# Patient Record
Sex: MALE | NOT HISPANIC OR LATINO | Employment: STUDENT | URBAN - METROPOLITAN AREA
[De-identification: names, ages, dates, MRNs, and addresses within clinical notes are randomized per-mention and may not be internally consistent; named-entity substitution may affect disease eponyms.]

---

## 2020-08-26 ENCOUNTER — TRANSFERRED RECORDS (OUTPATIENT)
Dept: HEALTH INFORMATION MANAGEMENT | Facility: CLINIC | Age: 30
End: 2020-08-26

## 2020-08-26 ENCOUNTER — MEDICAL CORRESPONDENCE (OUTPATIENT)
Dept: HEALTH INFORMATION MANAGEMENT | Facility: CLINIC | Age: 30
End: 2020-08-26

## 2020-08-27 ENCOUNTER — TRANSCRIBE ORDERS (OUTPATIENT)
Dept: OTHER | Age: 30
End: 2020-08-27

## 2020-08-27 DIAGNOSIS — K40.90 INGUINAL HERNIA: Primary | ICD-10-CM

## 2020-08-28 ENCOUNTER — TELEPHONE (OUTPATIENT)
Dept: SURGERY | Facility: CLINIC | Age: 30
End: 2020-08-28

## 2020-08-28 NOTE — TELEPHONE ENCOUNTER
[facility]    Phone Message    May a detailed message be left on voicemail: yes     Reason for Call: Other: Pt requesting call back. Pt is being referred to see Dr. Farah for an Inguinal Hernia. Pt would like to know what anchoring technique he uses prior to scheduling the appt, if he uses robotics or not     Action Taken: Message routed to:  [facility] surgery

## 2020-08-28 NOTE — TELEPHONE ENCOUNTER
Contacted patient to answer his questions. Discussed that Dr. Farah does robotic procedures. He has been scheduled for a consult on 9/15 at 7 am. Address verified. Patient will call with any further questions.

## 2020-09-14 ENCOUNTER — PATIENT OUTREACH (OUTPATIENT)
Dept: SURGERY | Facility: CLINIC | Age: 30
End: 2020-09-14

## 2020-09-14 NOTE — PROGRESS NOTES
Established Patient Telephone Reminder Call    Date of call:  09/14/20  Phone numbers:  Home number on file [phone] (home)    Reached patient/confirmed appointment:  No - left message:   on voicemail  Appointment with:   Dr. Paul Farah  Reason for visit:  Hernia    Appt time changed to 0730.

## 2020-09-15 ENCOUNTER — TELEPHONE (OUTPATIENT)
Dept: SURGERY | Facility: CLINIC | Age: 30
End: 2020-09-15

## 2020-09-15 ENCOUNTER — OFFICE VISIT (OUTPATIENT)
Dept: SURGERY | Facility: CLINIC | Age: 30
End: 2020-09-15
Attending: FAMILY MEDICINE
Payer: COMMERCIAL

## 2020-09-15 VITALS
WEIGHT: 146.2 LBS | OXYGEN SATURATION: 97 % | HEIGHT: 70 IN | HEART RATE: 58 BPM | BODY MASS INDEX: 20.93 KG/M2 | DIASTOLIC BLOOD PRESSURE: 75 MMHG | SYSTOLIC BLOOD PRESSURE: 125 MMHG

## 2020-09-15 DIAGNOSIS — K40.20 NON-RECURRENT BILATERAL INGUINAL HERNIA WITHOUT OBSTRUCTION OR GANGRENE: Primary | ICD-10-CM

## 2020-09-15 RX ORDER — CEFAZOLIN SODIUM 2 G/50ML
2 SOLUTION INTRAVENOUS
Status: CANCELLED | OUTPATIENT
Start: 2020-09-15

## 2020-09-15 RX ORDER — CEFAZOLIN SODIUM 1 G/50ML
1 INJECTION, SOLUTION INTRAVENOUS SEE ADMIN INSTRUCTIONS
Status: CANCELLED | OUTPATIENT
Start: 2020-09-15

## 2020-09-15 ASSESSMENT — MIFFLIN-ST. JEOR: SCORE: 1624.41

## 2020-09-15 ASSESSMENT — PAIN SCALES - GENERAL: PAINLEVEL: NO PAIN (0)

## 2020-09-15 NOTE — LETTER
9/15/2020       RE: Johnnie Sahni  [address] [facility]  [address]  [place] [address]     Dear Colleague,    Thank you for referring your patient, Johnnie Sahni, to the [facility] GENERAL SURGERY at [facility]. Please see a copy of my visit note below.    Johnnie Sahni is a 30 year old male with a 3 month history of a left inguinal mass with the following symptoms of lump and pain.    Onset did not occur with lifting.  Obstructive symptoms:  no  Urinary difficulties:  no  Chronic cough: no  Constipation:  no  Current level of activity:  High, works out regularly, student at [employer], lifts animals.    Past medical and surgical history, medications, allergies, family history, and social history were reviewed with the patient.    ROS: 10 point review of systems negative except noted in HPI  PHYSICAL EXAM  General appearance- healthy, alert, and in no distress.  Skin- Skin color and turgor normal.  No obvious rashes.  Neck- Neck is supple without obvious adenopathy.  Lungs- Respiratory effort unlabored.  Gait- Normal.  Abdomen - soft non distended, non tender without obvious masses.  BIH left greater than right.  Impression:  Inguinal hernia, bilateral  Recommendation:  Laparoscopic bilateral Inguinal Hernioplasty Robot assisted    A full discussion regarding the alternatives, risks, goals, and potential complications for this surgery was completed today.  The patient understood I would use non recalled mesh and  that the potential problems included but are not limited to:  Infection, bleeding, hematoma, seroma, recurrence, injury to nerve/muscle or involved testicle(if male), ischemic orchitis(if male), and chronic pain.    The patient verbally expressed understanding, was given the opportunity for questions, and gives full informed consent for the procedure.      Today the patient was instructed on the need for a preoperative H&P, NPO status prior to surgery, and the need to stop  anticoagulants prior to surgery.  Additional educational material, soap, and instructions will be mailed out to the patients home.    The total time spent with this patient was 20 minutes.  Of this time, greater than 50% was spent counseling and coordinating care.                Again, thank you for allowing me to participate in the care of your patient.      Sincerely,    Paul Farah MD

## 2020-09-15 NOTE — PATIENT INSTRUCTIONS
You met with Dr. Paul Farah.      Today's visit instructions:    Reminder:  Surgery Requirements  1. Your surgery will be at [address] [place] [address] or [facility] [place]  2. You will need to arrive 1 1/2 to 2 hours early based on the location of your surgery.  3. You will need someone to drive you home (over 18 years old) and stay with you for 24 hours after the procedure  4. You will need a preop physical with your regular doctor within 30 days of surgery- closer is always better  5. Stop any blood thinners, vitamins, minerals, or herbal supplements 5 days before surgery.  If you are taking a prescribed blood thinner please let us know for specific instructions  6. Fasting- a nurse from [facility] will call you 1-2 days before surgery to confirm your procedure and tell you when to stop eating and drinking  7. Wash with the soap the night before surgery and morning of surgery. See instructions in the Surgery Packet.  8. If you would like a procedure estimate please call Aline SABILLON, Financial Counselor, [phone].    At this time, any patient that does not have COVID-19 testing within 4 days of surgery and results available to the surgeon and anesthesia team before the procedure may have their procedure postponed or canceled. We do this to keep our patients, providers, and employees safe. If you decline to test, then you will need to contact your surgeon to determine when or if your procedure will still take place.    OR    We highly encourage patients to get tested for COVID-19 at one of our designated [facility] testing sites. We process the tests in our lab, which allows us to get the results quickly. If you choose to get tested at a non-[facility] location, you will need to contact your primary care provider to make those arrangements and ensure the results are available to your surgeon before you arrive for your procedure. If we do not receive the results in time, your procedure  may be postponed or canceled. Please make sure your test is collected 3-days prior to your procedure date. The results will need to get faxed to [phone].       After Your Laparoscopic Robotic Bilateral Inguinal Hernia Repair         Incision care     Remove the bandage the day after surgery, but leave the medical tape (Steri-Strips) or glue in place. These will loosen and fall off on their own 5 to 7 days after surgery.     You may take a shower the day after surgery. Carefully wash your incision with soap and water. Do not submerge yourself in water (bath, whirlpool, hot tub, pool, lake) for 14 days after surgery.       Always wash your hands before touching your incisions or removing bandages.     It is not unusual to form a collection of fluid or blood under your incision that may feel firm or squishy- it can take several weeks to months for your body to reabsorb it.  At times, it may even drain.  If that should happen keep the area clean with soap, water,  and cover with a clean gauze dressing. You can change this daily or as needed.     Other medicines     Wait to start aspirin or blood thinners until the day after surgery. You can continue your regular medicines at your normal time the day after surgery.     Your pain medicine may cause constipation (hard, dry stools). To help with this, take the stool softener your doctor gave you or an over-the-counter stool softener or laxative. You can stop taking this when you are no longer taking pain medicine and your bowel movements are back to normal.      For pain or discomfort     Take the narcotic pain medicine your doctor gave you as needed and as instructed on the bottle. If you prefer to use over-the-counter medication, use acetaminophen (Tylenol) or ibuprofen (Advil, Motrin) as instructed on the box. Do not take Tylenol if it is in your narcotic pain medication.     Use an ice pack on your groin for 20 minutes at a time as needed for the first 24 hours. Be  sure to protect your skin by putting a cloth between the ice pack and your skin.     After 24 hours you can switch to heat for 20 minutes as needed. Be sure to protect your skin by putting a cloth between the heat pack and your skin.     It is normal to feel a lump in your groin after surgery. This lump may take up to 8 weeks to go away.      Activities     No driving until you feel it s safe to do so. Don t drive while taking narcotic pain medicine.     You can return to your other activities as normal, no restrictions.      Special equipment     If we gave you an athletic supporter, wear this for the first 3 days after surgery. You can wear it longer than this if you wish.      Diet     You can eat your regular meals after surgery.      When to call the doctor   Call your doctor if you have:     A fever above 101 F (38.3 C) (taken under the tongue), or a fever or chills lasting more than a day.     Redness at the incision site.     Any fluid or blood draining from the incision, especially if it smells bad.      Severe pain that doesn t improve with pain medicine.      Go to the emergency room if:   You can t urinate (pee) or feel pressure when you urinate. We will place a small, thin tube (catheter) to empty your bladder. This needs to stay in place for at least 2 days.      We will call you 2 to 4 days after surgery to review this handout, answer questions and help arrange after-surgery care. If you have questions or concerns, please call [phone] during regular office hours. If you need to call after business hours, call [phone] and ask to page the surgeon on-call.     If you have questions please contact Esequiel MILLS or Pattie MILLS during regular clinic hours, Monday through Friday 7:30 AM - 4:00 PM, or you can contact us via [employer] at anytime.       If you have urgent needs after-hours, weekends, or holidays please call the hospital at [phone] and ask to speak with our on-call General Surgery  Team.    Appointment [phone], option #1   Nurse Advice (Esequiel Blankenship): [phone]   Surgery Scheduler (Adriana): [phone]  Fax: [phone]

## 2020-09-15 NOTE — PROGRESS NOTES
Johnnie Sahni is a 30 year old male with a 3 month history of a left inguinal mass with the following symptoms of lump and pain.    Onset did not occur with lifting.  Obstructive symptoms:  no  Urinary difficulties:  no  Chronic cough: no  Constipation:  no  Current level of activity:  High, works out regularly, student at [employer], lifts animals.    Past medical and surgical history, medications, allergies, family history, and social history were reviewed with the patient.    ROS: 10 point review of systems negative except noted in HPI  PHYSICAL EXAM  General appearance- healthy, alert, and in no distress.  Skin- Skin color and turgor normal.  No obvious rashes.  Neck- Neck is supple without obvious adenopathy.  Lungs- Respiratory effort unlabored.  Gait- Normal.  Abdomen - soft non distended, non tender without obvious masses.  BIH left greater than right.  Impression:  Inguinal hernia, bilateral  Recommendation:  Laparoscopic bilateral Inguinal Hernioplasty Robot assisted    A full discussion regarding the alternatives, risks, goals, and potential complications for this surgery was completed today.  The patient understood I would use non recalled mesh and  that the potential problems included but are not limited to:  Infection, bleeding, hematoma, seroma, recurrence, injury to nerve/muscle or involved testicle(if male), ischemic orchitis(if male), and chronic pain.    The patient verbally expressed understanding, was given the opportunity for questions, and gives full informed consent for the procedure.      Today the patient was instructed on the need for a preoperative H&P, NPO status prior to surgery, and the need to stop anticoagulants prior to surgery.  Additional educational material, soap, and instructions will be mailed out to the patients home.    The total time spent with this patient was 20 minutes.  Of this time, greater than 50% was spent counseling and coordinating care.

## 2020-09-15 NOTE — NURSING NOTE
"Chief Complaint   Patient presents with     Consult     New patient consult for left inguinal hernia.        Vitals:    09/15/20 0725   BP: 125/75   BP Location: Left arm   Patient Position: Sitting   Cuff Size: Adult Regular   Pulse: 58   SpO2: 97%   Weight: 66.3 kg (146 lb 3.2 oz)   Height: 1.778 m (5' 10\")       Body mass index is 20.98 kg/m .    Shorty Prado LPN                        "

## 2020-09-15 NOTE — TELEPHONE ENCOUNTER
Patient wants to have a discussion about when robotic surgery with Dr Farah will be scheduled.    Spoke with patient, he is interested in [place] [facility], but wants to get his COVID-19 test done at the [facility]. Usually the [place] [facility] schedules their patients' COVID tests with them.     Discussed dates; [place] [facility] vs [place] would depend on if he can do his COVID test at the [facility] and also may be influenced by wanting to schedule out farther than we are currently able to do at the [facility].    Message sent to GENE Iraheta because patient has questions about recovery after surgery and whether it will affect his studies.    I told him that I'd check with the [place] [facility] to see if they would accept a COVID test from us instead of at their facility. I also said I'd ask the nurse to call him back to go over his medical questions. He said he will call me tomorrow regarding dates.

## 2020-09-15 NOTE — LETTER
9/15/2020       RE: Johnnie Sahni  [address] [facility]  [address]  [place] [address]     Dear Colleague,    Thank you for referring your patient, Johnnie Sahni, to the [facility] GENERAL SURGERY at [facility]. Please see a copy of my visit note below.    Johnnie Sahni is a 30 year old male with a 3 month history of a left inguinal mass with the following symptoms of lump and pain.    Onset did not occur with lifting.  Obstructive symptoms:  no  Urinary difficulties:  no  Chronic cough: no  Constipation:  no  Current level of activity:  High, works out regularly, student at [employer], lifts animals.    Past medical and surgical history, medications, allergies, family history, and social history were reviewed with the patient.    ROS: 10 point review of systems negative except noted in HPI  PHYSICAL EXAM  General appearance- healthy, alert, and in no distress.  Skin- Skin color and turgor normal.  No obvious rashes.  Neck- Neck is supple without obvious adenopathy.  Lungs- Respiratory effort unlabored.  Gait- Normal.  Abdomen - soft non distended, non tender without obvious masses.  BIH left greater than right.  Impression:  Inguinal hernia, bilateral  Recommendation:  Laparoscopic bilateral Inguinal Hernioplasty Robot assisted    A full discussion regarding the alternatives, risks, goals, and potential complications for this surgery was completed today.  The patient understood I would use non recalled mesh and  that the potential problems included but are not limited to:  Infection, bleeding, hematoma, seroma, recurrence, injury to nerve/muscle or involved testicle(if male), ischemic orchitis(if male), and chronic pain.    The patient verbally expressed understanding, was given the opportunity for questions, and gives full informed consent for the procedure.      Today the patient was instructed on the need for a preoperative H&P, NPO status prior to surgery, and the need to stop  anticoagulants prior to surgery.  Additional educational material, soap, and instructions will be mailed out to the patients home.    The total time spent with this patient was 20 minutes.  Of this time, greater than 50% was spent counseling and coordinating care.      Again, thank you for allowing me to participate in the care of your patient.  Sincerely,    Paul Farah MD

## 2020-09-15 NOTE — NURSING NOTE
Pre and Post op Patient Education/Teaching Flowsheet  Relevant Diagnosis:  Inguinal hernia  Teaching Topic:  Pre and post op teaching  Person(s) Involved in teaching:  Patient     Motivation Level:  Asks Questions:  Yes  Eager to Learn:  Yes  Cooperative:  Yes  Receptive (willing/able to accept information):  Yes  Any cultural factors/Adventist beliefs that may influence understanding or compliance?  No    Patient/caregiver/family demonstrates understanding of the following:  Reason for the appointment, diagnosis, and treatment plan:  Yes  Patient demonstrates understanding of the following:  Pre-op bowel prep:  No  Post-op pain management recommendations (medications, ice compress, binder/athletic supporter (if applicable), etc.:  Yes  Inguinal hernia patients:  Post-op urinary retention- discussed signs/symptoms and visit to ER for [place] catheter placement and to stay in place for at least 48 hours:  Yes  Restrictions:  Yes  Medications to take the day of surgery:  Per PCP  Blood thinner medications discussed and when to stop (if applicable):  Yes  Wound care:  Yes  Diabetes medication management (if applicable):  Per PCP  Which situations necessitate calling provider and whom to contact:  Discussed how to contact the hospital, nurse, and clinic scheduling staff if necessary      Date and time of surgery:  Yes  Location of surgery: [address] [place] [address] or [facility] [place]  History and Physical and any other testing necessary prior to surgery:  Yes. H&P w/PCP.  Required time line for completion of History and Physical and any pre-op testing:  Yes  Discuss need for someone to drive patient home and stay with them for 24 hours:  Yes  Pre-op showering/scrub information with Surgical Scrub:  Yes  NPO Guidelines:  NPO per Anesthesia Guidelines  COVID-19 Testing:  Yes    Infection Prevention: Patient demonstrates understanding of the following:  Patient instructed on hand hygiene:  Yes  Surgical  procedure site care will be taught and will be reviewed at the time of discharge  Signs and symptoms of infection taught:  Yes  Wound care reviewed and will be taught at the time of discharge  Central venous catheter care will be taught at the time of discharge (if applicable)    Post-op follow-up:  Instructional materials used/given/mailed:  [place] [facility] Booklet, post op teaching sheet, Map, Soap, and arrival/location information    Surgical instructions mailed to patient

## 2020-09-16 ENCOUNTER — PATIENT OUTREACH (OUTPATIENT)
Dept: SURGERY | Facility: CLINIC | Age: 30
End: 2020-09-16

## 2020-09-16 NOTE — TELEPHONE ENCOUNTER
Spoke with Madide at the [facility], patient can have his COVID test at the [facility], but results will need to be either in-hand or faxed to them. I set myself an in basket message reminder for Friday 11/6 at noon to fax the COVID-19 test results to the [facility] at [phone].    Spoke with patient, completed the following scheduling:    Patient is scheduled for surgery with Dr. Paul Farah    Spoke with or Left message for: Johnnie Sahni in clinic about surgery dates.    Date of Surgery: 11/9/2020 at 7:30 AM with Dr. Paul Farah    Location: [facility]    Pre-op with surgeon (if applicable): 9/15/2020    H&P: Scheduled with [facility] on 11/5/2020 at 11:20 AM. Patient is aware that he can call to schedule a pre-op with the [facility] ([facility]) if he is unable to schedule with his primary doctor.      Informed patient they will need an adult : Yes  Name of : patient is aware and will have a     Special Equipment: DaVinci Robot    COVID-19 testing: at the [facility] on Thursday 11/5/2020 at 11:00 am    Surgery packet: will send via [employer]     Additional comments: He also knows to call general surgery if he experiences any cold or flu symptoms. Surgery teaching and soap were given to in clinic on 9/15/2020. He was asked to call [phone] if he needs to reschedule and [phone] if he experiences any symptoms.    Sent message to GENE Blankenship to send fax to [facility].

## 2020-09-16 NOTE — PROGRESS NOTES
Attempted to contact patient to answer questions. Left a message asking him to call back when convenient. GS number provided.      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 He is having a robotic inguinal herniorrhaphy. Not sure of the date yet. Also not sure if [facility] vs [place] yet.     If you could possibly call patient to discuss what he can expect from recovery from surgery, that would be great - he says he forgot to ask questions today, and now he wishes he would have asked more questions.     Thank-you!  Adriana

## 2020-09-16 NOTE — PROGRESS NOTES
Patient returned call.  He recently met with Dr. Farah regarding an inguinal hernia and is inquiring about recovery after surgery.    All questions answered.

## 2020-09-22 ENCOUNTER — TELEPHONE (OUTPATIENT)
Dept: SURGERY | Facility: CLINIC | Age: 30
End: 2020-09-22

## 2020-09-22 NOTE — TELEPHONE ENCOUNTER
Spoke with patient, explained that with his surgery at [facility] being at 7:30 am on a Monday morning, it is not possible for me to guarantee that his COVID test results would be back in time if done at [facility]. So, I am recommending that he get his COVID test at the [facility] instead. Patient expressed understanding, the following scheduling was completed, with some changes made to previous appointments:    Spoke with Maddie at the [facility], patient can have his COVID test at the [facility] but results will need to be either in-hand or faxed to them. I set myself an in basket message reminder for Friday 11/6 at noon to fax the COVID-19 test results to the [facility] at [phone].     Spoke with patient, completed the following scheduling:     Patient is scheduled for surgery with Dr. Paul Farah     Spoke with or Left message for: Johnnie Sahni in clinic about surgery dates.     Date of Surgery: 11/9/2020 at 7:30 AM with Dr. Paul Farah     Location: [facility]     Pre-op with surgeon (if applicable): 9/15/2020     H&P: Scheduled with [facility] on 10/28/2020 at 2:30 PM. Patient is aware that he can call to schedule a pre-op with the [facility] ([facility]) if he is unable to schedule with his primary doctor.       Informed patient they will need an adult : Yes  Name of : girlfriend     Special Equipment: DaVinci Robot     COVID-19 testing: at the [facility] on 11/5/2020 at 12:00 PM     Surgery packet: sent again to reflect changes via [employer] and Mail today     Additional comments: He also knows to call general surgery if he experiences any cold or flu symptoms. Surgery teaching and soap were given to in clinic on 9/15/2020. He was asked to call [phone] if he needs to reschedule and [phone] if he experiences any symptoms.

## 2020-10-21 ENCOUNTER — OFFICE VISIT (OUTPATIENT)
Dept: FAMILY MEDICINE | Facility: CLINIC | Age: 30
End: 2020-10-21
Payer: COMMERCIAL

## 2020-10-21 VITALS
BODY MASS INDEX: 21.97 KG/M2 | WEIGHT: 153.1 LBS | TEMPERATURE: 98.1 F | HEART RATE: 81 BPM | DIASTOLIC BLOOD PRESSURE: 75 MMHG | OXYGEN SATURATION: 97 % | SYSTOLIC BLOOD PRESSURE: 111 MMHG | RESPIRATION RATE: 16 BRPM

## 2020-10-21 DIAGNOSIS — Z01.818 PRE-OPERATIVE EXAMINATION: ICD-10-CM

## 2020-10-21 DIAGNOSIS — K40.20 NON-RECURRENT BILATERAL INGUINAL HERNIA WITHOUT OBSTRUCTION OR GANGRENE: Primary | ICD-10-CM

## 2020-10-21 PROCEDURE — 99204 OFFICE O/P NEW MOD 45 MIN: CPT | Performed by: NURSE PRACTITIONER

## 2020-10-21 ASSESSMENT — PAIN SCALES - GENERAL: PAINLEVEL: NO PAIN (0)

## 2020-10-21 NOTE — LETTER
10/21/2020      RE: Johnnie Sahni  [address]   [address]  [place] [address]       [facility] [address]  5TH FLOOR  [facility] [address]  Phone: [phone]  Fax: [phone]  Primary Provider: No primary care provider on file.  Pre-op Performing Provider: SILVER BOOTHE    PREOPERATIVE EVALUATION:  Today's date: 10/21/2020    Johnnie Sahni is a 30 year old male who presents for a preoperative evaluation.    Surgical Information:  Surgery/Procedure: Bilateral Hernia Repair  Surgery Location: [facility]  Surgeon: Dr. Alicea  Surgery Date: 11/09/20  Time of Surgery: 7:30am  Where patient plans to recover: At home with family and At home alone  Fax number for surgical facility: Note does not need to be faxed, will be available electronically in Epic.    Type of Anesthesia Anticipated: General    Subjective     HPI related to upcoming procedure:     Preoperative Questionnaire:   No - Have you ever had a heart attack or stroke?  No - Have you ever had surgery on your heart or blood vessels, such as a stent, coronary (heart) bypass, or surgery on an artery in the head, neck, heart, or legs?  No - Do you have chest pain when you are physically active?  No - Do you have a history of heart failure?  No - Do you currently have a cold, bronchitis, or symptoms of other respiratory (head and chest) infections?  No - Do you have a cough, shortness of breath, or wheezing?  No - Do you or anyone in your family have a history of blood clots?  No - Do you or anyone in your family have a serious bleeding problem, such as long-lasting bleeding after surgeries or cuts?  No - Have you ever had anemia or been told to take iron pills?  No - Have you had any abnormal blood loss such as black, tarry or bloody stools, or abnormal vaginal bleeding?  No - Have you ever had a blood transfusion?  Yes - Are you willing to have a blood transfusion if it is medically needed  before, during, or after your surgery?  No - Have you or anyone in your family ever had problems with anesthesia (sedation for surgery)?  No - Do you have sleep apnea, excessive snoring, or daytime drowsiness?   No - Do you have any artifical heart valves or other implanted medical devices, such as a pacemaker, defibrillator, or continuous glucose monitor?  No - Do you have any artifical joints?  No - Are you allergic to latex?  No - Is there any chance that you may be pregnant?    Patient has a Health Care Directive or Living Will:  NO    Health Care Directive:  Patient does not have a Health Care Directive or Living Will: Discussed advance care planning with patient; however, patient declined at this time.    Preoperative Review of :   reviewed - no record of controlled substances prescribed.      Status of Chronic Conditions:  See problem list for active medical problems.  Problems all longstanding and stable, except as noted/documented.  See ROS for pertinent symptoms related to these conditions.      Review of Systems  Constitutional, neuro, ENT, endocrine, pulmonary, cardiac, gastrointestinal, genitourinary, musculoskeletal, integument and psychiatric systems are negative, except as otherwise noted.    There are no active problems to display for this patient.     No past medical history on file.  No past surgical history on file.  No current outpatient medications on file.       No Known Allergies     Social History     Tobacco Use     Smoking status: Never Smoker     Smokeless tobacco: Never Used   Substance Use Topics     Alcohol use: Yes     No family history on file.  History   Drug Use Unknown         Objective     There were no vitals taken for this visit.    Physical Exam    GENERAL APPEARANCE: healthy, alert and no distress     EYES: EOMI,  PERRL     HENT: ear canals and TM's normal and nose and mouth without ulcers or lesions     NECK: no adenopathy, no asymmetry, masses, or scars and thyroid  normal to palpation     RESP: lungs clear to auscultation - no rales, rhonchi or wheezes     CV: regular rates and rhythm, normal S1 S2, no S3 or S4 and no murmur, click or rub     ABDOMEN:  soft, nontender, no HSM or masses and bowel sounds normal     MS: extremities normal- no gross deformities noted, no evidence of inflammation in joints, FROM in all extremities.     SKIN: no suspicious lesions or rashes     NEURO: Normal strength and tone, sensory exam grossly normal, mentation intact and speech normal     PSYCH: mentation appears normal. and affect normal/bright     LYMPHATICS: No cervical adenopathy    No results for input(s): HGB, PLT, INR, NA, POTASSIUM, CR, A1C in the last 91392 hours.     Diagnostics:  No labs were ordered during this visit-not required.  No EKG required, no history of coronary heart disease, significant arrhythmia, peripheral arterial disease or other structural heart disease.    Revised Cardiac Risk Index (RCRI):  The patient has the following serious cardiovascular risks for perioperative complications:   - No serious cardiac risks = 0 points     RCRI Interpretation: 0 points: Class I (very low risk - 0.4% complication rate)       Assessment & Plan   The proposed surgical procedure is considered LOW risk.    Assessment & Plan     Non-recurrent bilateral inguinal hernia without obstruction or gangrene      Pre-operative examination    No follow-ups on file.    Sraanya Gray NP  [facility] [place]    Risks and Recommendations:  The patient has the following additional risks and recommendations for perioperative complications:   - No identified additional risk factors other than previously addressed    Medication Instructions:  Patient is on no chronic medications    RECOMMENDATION:  APPROVAL GIVEN to proceed with proposed procedure, without further diagnostic evaluation.    Signed Electronically by: AMBER Rick, CNP      Copy of this  evaluation report is provided to requesting physician.    Preop [facility] Preop Guidelines    Revised Cardiac Risk Index      Saranya Gray NP

## 2020-10-21 NOTE — PATIENT INSTRUCTIONS
First, have your Covid test as scheduled. Next, sit down the night prior to surgery and write down any questions you may have. Thank you.   [facility] Medication Refill Request Information:  * Please contact your pharmacy regarding ANY request for medication refills.  ** NP Clinic Prescription Fax = [phone]  * Please allow 3 business days for routine medication refills.  * Please allow 5 business days for controlled substance medication refills.     [facility] Test Result notification information:  *You will be notified with in 7-10 days of your appointment day regarding the results of your test.  If you are on MyChart you will be notified as soon as the provider has reviewed the results and signed off on them.    [facility]: [phone] ;

## 2020-10-21 NOTE — PROGRESS NOTES
[facility] [address] [address]  Phone: [phone]  Fax: [phone]  Primary Provider: No primary care provider on file.  Pre-op Performing Provider: SILVER BOOTHE    PREOPERATIVE EVALUATION:  Today's date: 10/21/2020    Johnnie Sahni is a 30 year old male who presents for a preoperative evaluation.    Surgical Information:  Surgery/Procedure: Bilateral Hernia Repair  Surgery Location: [facility]  Surgeon: Dr. Alicea  Surgery Date: 11/09/20  Time of Surgery: 7:30am  Where patient plans to recover: At home with family and At home alone  Fax number for surgical facility: Note does not need to be faxed, will be available electronically in Epic.    Type of Anesthesia Anticipated: General    Subjective     HPI related to upcoming procedure:     Preoperative Questionnaire:   No - Have you ever had a heart attack or stroke?  No - Have you ever had surgery on your heart or blood vessels, such as a stent, coronary (heart) bypass, or surgery on an artery in the head, neck, heart, or legs?  No - Do you have chest pain when you are physically active?  No - Do you have a history of heart failure?  No - Do you currently have a cold, bronchitis, or symptoms of other respiratory (head and chest) infections?  No - Do you have a cough, shortness of breath, or wheezing?  No - Do you or anyone in your family have a history of blood clots?  No - Do you or anyone in your family have a serious bleeding problem, such as long-lasting bleeding after surgeries or cuts?  No - Have you ever had anemia or been told to take iron pills?  No - Have you had any abnormal blood loss such as black, tarry or bloody stools, or abnormal vaginal bleeding?  No - Have you ever had a blood transfusion?  Yes - Are you willing to have a blood transfusion if it is medically needed before, during, or after your surgery?  No - Have you or anyone in your family ever  had problems with anesthesia (sedation for surgery)?  No - Do you have sleep apnea, excessive snoring, or daytime drowsiness?   No - Do you have any artifical heart valves or other implanted medical devices, such as a pacemaker, defibrillator, or continuous glucose monitor?  No - Do you have any artifical joints?  No - Are you allergic to latex?  No - Is there any chance that you may be pregnant?    Patient has a Health Care Directive or Living Will:  NO    Health Care Directive:  Patient does not have a Health Care Directive or Living Will: Discussed advance care planning with patient; however, patient declined at this time.    Preoperative Review of :   reviewed - no record of controlled substances prescribed.      Status of Chronic Conditions:  See problem list for active medical problems.  Problems all longstanding and stable, except as noted/documented.  See ROS for pertinent symptoms related to these conditions.      Review of Systems  Constitutional, neuro, ENT, endocrine, pulmonary, cardiac, gastrointestinal, genitourinary, musculoskeletal, integument and psychiatric systems are negative, except as otherwise noted.    There are no active problems to display for this patient.     No past medical history on file.  No past surgical history on file.  No current outpatient medications on file.       No Known Allergies     Social History     Tobacco Use     Smoking status: Never Smoker     Smokeless tobacco: Never Used   Substance Use Topics     Alcohol use: Yes     No family history on file.  History   Drug Use Unknown         Objective     There were no vitals taken for this visit.    Physical Exam    GENERAL APPEARANCE: healthy, alert and no distress     EYES: EOMI,  PERRL     HENT: ear canals and TM's normal and nose and mouth without ulcers or lesions     NECK: no adenopathy, no asymmetry, masses, or scars and thyroid normal to palpation     RESP: lungs clear to auscultation - no rales, rhonchi or  wheezes     CV: regular rates and rhythm, normal S1 S2, no S3 or S4 and no murmur, click or rub     ABDOMEN:  soft, nontender, no HSM or masses and bowel sounds normal     MS: extremities normal- no gross deformities noted, no evidence of inflammation in joints, FROM in all extremities.     SKIN: no suspicious lesions or rashes     NEURO: Normal strength and tone, sensory exam grossly normal, mentation intact and speech normal     PSYCH: mentation appears normal. and affect normal/bright     LYMPHATICS: No cervical adenopathy    No results for input(s): HGB, PLT, INR, NA, POTASSIUM, CR, A1C in the last 34473 hours.     Diagnostics:  No labs were ordered during this visit-not required.  No EKG required, no history of coronary heart disease, significant arrhythmia, peripheral arterial disease or other structural heart disease.    Revised Cardiac Risk Index (RCRI):  The patient has the following serious cardiovascular risks for perioperative complications:   - No serious cardiac risks = 0 points     RCRI Interpretation: 0 points: Class I (very low risk - 0.4% complication rate)       Assessment & Plan   The proposed surgical procedure is considered LOW risk.    Assessment & Plan     Non-recurrent bilateral inguinal hernia without obstruction or gangrene      Pre-operative examination    No follow-ups on file.    Saranya Gray NP  [facility] [place]    Risks and Recommendations:  The patient has the following additional risks and recommendations for perioperative complications:   - No identified additional risk factors other than previously addressed    Medication Instructions:  Patient is on no chronic medications    RECOMMENDATION:  APPROVAL GIVEN to proceed with proposed procedure, without further diagnostic evaluation.    Signed Electronically by: AMBER Rick, CNP      Copy of this evaluation report is provided to requesting physician.    [facility]  [place] Preop Guidelines    Revised Cardiac Risk Index

## 2020-10-21 NOTE — NURSING NOTE
Chief Complaint   Patient presents with     Pre-Op Exam     Patient comes in for a pre op exam.          Max Yeboah MA on 10/21/2020 at 2:04 PM

## 2020-11-09 ENCOUNTER — TRANSFERRED RECORDS (OUTPATIENT)
Dept: HEALTH INFORMATION MANAGEMENT | Facility: CLINIC | Age: 30
End: 2020-11-09

## 2020-11-10 ENCOUNTER — PATIENT OUTREACH (OUTPATIENT)
Dept: SURGERY | Facility: CLINIC | Age: 30
End: 2020-11-10

## 2020-11-10 ENCOUNTER — TELEPHONE (OUTPATIENT)
Dept: NURSING | Facility: CLINIC | Age: 30
End: 2020-11-10

## 2020-11-10 DIAGNOSIS — G89.18 POSTOPERATIVE PAIN: Primary | ICD-10-CM

## 2020-11-10 RX ORDER — HYDROCODONE BITARTRATE AND ACETAMINOPHEN 5; 325 MG/1; MG/1
1-2 TABLET ORAL EVERY 6 HOURS PRN
Qty: 10 TABLET | Refills: 0 | Status: ON HOLD | OUTPATIENT
Start: 2020-11-10 | End: 2020-11-12

## 2020-11-10 RX ORDER — DIAZEPAM 5 MG
5 TABLET ORAL EVERY 6 HOURS PRN
Qty: 7 TABLET | Refills: 0 | Status: ON HOLD | OUTPATIENT
Start: 2020-11-10 | End: 2020-11-12

## 2020-11-10 RX ORDER — ONDANSETRON 4 MG/1
4 TABLET, FILM COATED ORAL EVERY 6 HOURS PRN
Qty: 10 TABLET | Refills: 0 | Status: ON HOLD | OUTPATIENT
Start: 2020-11-10 | End: 2020-11-12

## 2020-11-10 NOTE — TELEPHONE ENCOUNTER
".[facility]: Nurse Triage Note  SITUATION/BACKGROUND                                                      Johnnie Sahni is a 30 year old male who calls with complaints of abdominal pain s/p bilateral inguino hernia repair on 11/9/20 by Paul Farah MD  Patient stated that he had been prescribed Norco 5 mg; take 2 pills every 4 hrs PRN for pain. Duration for relief is only lasting for 1.5 hours. This nurse inquired about incisions? Patient vague in response. \"Abdominal incisions  remain bandaged\".  This nurse asked how many and where? Initially, he stated 2, then 3. Areas are right/left/ middle.   Patient stated that he had emesis x 5 today - light green in color. He has a patch behind his ear prescribed for N/V that suppose to help for 3 days.  No BM yet. Only able to consume sips of liquids at this time...   Denies any fever or other symptoms at this time.   Routed to Surgery as High Priority to review and follow up call to [phone].         "

## 2020-11-10 NOTE — PROGRESS NOTES
Johnnie Sahni is a patient that underwent robotic inguinal hernia repair with Dr. Farah yesterday at the [facility] [place].  He is calling the office at this time with poor postoperative pain management and nausea/vomiting.      Spoke with the patient and he states that he has been taking Norco (2 tabs) every four hours as well as Zofran 8 mg every 8 hours.  He states that he becomes nauseated closer to the time that he can remedicate with narcotics.  He has Zofran on hand and he was encouraged to take it as directed and then wait at least 30 minutes and eat something small with a small amount of  fluid- if no nausea/vomiting he can take Norco.  Discussed plan to wean off of Norco over the next several days as pain improves.  Often the first 3 days after surgery are the most uncomfortable.  If no further nausea/vomiting he may start Ibuprofen 400 mg PO every 6 hours with food. Encouraged patient to take Valium as directed for a couple of doses then PRN which may provide better pain control.  Johnnie was asked to alternate heat/ice to his groin on/off every 20 minutes (protect skin from injury).  He was encouraged to ambulate every hour.    Rx for Norco, Valium, and Zofran sent to [facility] in [place] (where he picked up his Rx yesterday) and he would have preferred a closer location.  Spoke with a representative from the pharmacy and the Rx can not be transferred as they are scheduled medication.  Mr. Sahni will send a representative to the pharmacy.    All of the patients questions were answered. Plan to call him again tomorrow for routine post-procedure call.

## 2020-11-10 NOTE — TELEPHONE ENCOUNTER
Call from Johnnie, it has been an hour and he is asking if there is a response to his message.  Call to surgery, message and plan are being worked on, Johnnie will receive call back today.  Advised Johnnie he will be receiving call back today, no time frame given.    ADDENDUM  11/10/20  12:37 PM  Attempted to return call to patient at 1130 with no answer. LM on VM to call office. Situation discussed with Dr. Farah.  See RN[facility] encounter.  Pattie Aguayo RN, BSN, CNOR, RNFA, CBCN  [facility] General Surgery

## 2020-11-11 ENCOUNTER — PATIENT OUTREACH (OUTPATIENT)
Dept: SURGERY | Facility: CLINIC | Age: 30
End: 2020-11-11

## 2020-11-11 ENCOUNTER — APPOINTMENT (OUTPATIENT)
Dept: CT IMAGING | Facility: CLINIC | Age: 30
End: 2020-11-11
Attending: EMERGENCY MEDICINE
Payer: COMMERCIAL

## 2020-11-11 ENCOUNTER — HOSPITAL ENCOUNTER (INPATIENT)
Facility: CLINIC | Age: 30
LOS: 2 days | Discharge: HOME | End: 2020-11-13
Attending: EMERGENCY MEDICINE | Admitting: SURGERY
Payer: COMMERCIAL

## 2020-11-11 ENCOUNTER — NURSE TRIAGE (OUTPATIENT)
Dept: NURSING | Facility: CLINIC | Age: 30
End: 2020-11-11

## 2020-11-11 DIAGNOSIS — Z20.828 EXPOSURE TO SARS-ASSOCIATED CORONAVIRUS: ICD-10-CM

## 2020-11-11 DIAGNOSIS — K56.609 SMALL BOWEL OBSTRUCTION (H): Primary | ICD-10-CM

## 2020-11-11 LAB
ALBUMIN SERPL-MCNC: 4.1 G/DL (ref 3.4–5)
ALP SERPL-CCNC: 73 U/L (ref 40–150)
ALT SERPL W P-5'-P-CCNC: 19 U/L (ref 0–70)
ANION GAP SERPL CALCULATED.3IONS-SCNC: 8 MMOL/L (ref 3–14)
AST SERPL W P-5'-P-CCNC: 16 U/L (ref 0–45)
BASOPHILS # BLD AUTO: 0 10E9/L (ref 0–0.2)
BASOPHILS NFR BLD AUTO: 0.4 %
BILIRUB SERPL-MCNC: 0.9 MG/DL (ref 0.2–1.3)
BUN SERPL-MCNC: 19 MG/DL (ref 7–30)
CALCIUM SERPL-MCNC: 10.3 MG/DL (ref 8.5–10.1)
CHLORIDE SERPL-SCNC: 98 MMOL/L (ref 94–109)
CO2 SERPL-SCNC: 29 MMOL/L (ref 20–32)
CREAT SERPL-MCNC: 1.25 MG/DL (ref 0.66–1.25)
DIFFERENTIAL METHOD BLD: ABNORMAL
EOSINOPHIL # BLD AUTO: 0 10E9/L (ref 0–0.7)
EOSINOPHIL NFR BLD AUTO: 0.2 %
ERYTHROCYTE [DISTWIDTH] IN BLOOD BY AUTOMATED COUNT: 13.7 % (ref 10–15)
GFR SERPL CREATININE-BSD FRML MDRD: 76 ML/MIN/{1.73_M2}
GLUCOSE SERPL-MCNC: 112 MG/DL (ref 70–99)
HCT VFR BLD AUTO: 54.4 % (ref 40–53)
HGB BLD-MCNC: 18.1 G/DL (ref 13.3–17.7)
IMM GRANULOCYTES # BLD: 0 10E9/L (ref 0–0.4)
IMM GRANULOCYTES NFR BLD: 0.3 %
LYMPHOCYTES # BLD AUTO: 1.3 10E9/L (ref 0.8–5.3)
LYMPHOCYTES NFR BLD AUTO: 11.3 %
MCH RBC QN AUTO: 29 PG (ref 26.5–33)
MCHC RBC AUTO-ENTMCNC: 33.3 G/DL (ref 31.5–36.5)
MCV RBC AUTO: 87 FL (ref 78–100)
MONOCYTES # BLD AUTO: 1.1 10E9/L (ref 0–1.3)
MONOCYTES NFR BLD AUTO: 9.5 %
NEUTROPHILS # BLD AUTO: 8.9 10E9/L (ref 1.6–8.3)
NEUTROPHILS NFR BLD AUTO: 78.3 %
NRBC # BLD AUTO: 0 10*3/UL
NRBC BLD AUTO-RTO: 0 /100
PLATELET # BLD AUTO: 318 10E9/L (ref 150–450)
POTASSIUM SERPL-SCNC: 3.8 MMOL/L (ref 3.4–5.3)
PROT SERPL-MCNC: 8.8 G/DL (ref 6.8–8.8)
RBC # BLD AUTO: 6.24 10E12/L (ref 4.4–5.9)
SODIUM SERPL-SCNC: 134 MMOL/L (ref 133–144)
WBC # BLD AUTO: 11.3 10E9/L (ref 4–11)

## 2020-11-11 PROCEDURE — 99285 EMERGENCY DEPT VISIT HI MDM: CPT | Performed by: EMERGENCY MEDICINE

## 2020-11-11 PROCEDURE — 258N000003 HC RX IP 258 OP 636: Performed by: EMERGENCY MEDICINE

## 2020-11-11 PROCEDURE — 96375 TX/PRO/DX INJ NEW DRUG ADDON: CPT | Performed by: EMERGENCY MEDICINE

## 2020-11-11 PROCEDURE — 80053 COMPREHEN METABOLIC PANEL: CPT | Performed by: EMERGENCY MEDICINE

## 2020-11-11 PROCEDURE — 120N000002 HC R&B MED SURG/OB UMMC

## 2020-11-11 PROCEDURE — 250N000011 HC RX IP 250 OP 636: Performed by: EMERGENCY MEDICINE

## 2020-11-11 PROCEDURE — 74177 CT ABD & PELVIS W/CONTRAST: CPT | Mod: 26 | Performed by: RADIOLOGY

## 2020-11-11 PROCEDURE — 96361 HYDRATE IV INFUSION ADD-ON: CPT | Performed by: EMERGENCY MEDICINE

## 2020-11-11 PROCEDURE — 74177 CT ABD & PELVIS W/CONTRAST: CPT

## 2020-11-11 PROCEDURE — 99285 EMERGENCY DEPT VISIT HI MDM: CPT | Mod: 25 | Performed by: EMERGENCY MEDICINE

## 2020-11-11 PROCEDURE — C9803 HOPD COVID-19 SPEC COLLECT: HCPCS | Performed by: EMERGENCY MEDICINE

## 2020-11-11 PROCEDURE — 85025 COMPLETE CBC W/AUTO DIFF WBC: CPT | Performed by: EMERGENCY MEDICINE

## 2020-11-11 PROCEDURE — 96374 THER/PROPH/DIAG INJ IV PUSH: CPT | Mod: 59 | Performed by: EMERGENCY MEDICINE

## 2020-11-11 RX ORDER — OXYMETAZOLINE HYDROCHLORIDE 0.05 G/100ML
2 SPRAY NASAL
Status: COMPLETED | OUTPATIENT
Start: 2020-11-11 | End: 2020-11-12

## 2020-11-11 RX ORDER — IOPAMIDOL 755 MG/ML
92 INJECTION, SOLUTION INTRAVASCULAR ONCE
Status: COMPLETED | OUTPATIENT
Start: 2020-11-11 | End: 2020-11-11

## 2020-11-11 RX ORDER — POLYETHYLENE GLYCOL 3350 17 G/17G
1 POWDER, FOR SOLUTION ORAL DAILY
COMMUNITY

## 2020-11-11 RX ORDER — ONDANSETRON 2 MG/ML
4 INJECTION INTRAMUSCULAR; INTRAVENOUS EVERY 30 MIN PRN
Status: DISCONTINUED | OUTPATIENT
Start: 2020-11-11 | End: 2020-11-13 | Stop reason: HOSPADM

## 2020-11-11 RX ORDER — SODIUM CHLORIDE, SODIUM LACTATE, POTASSIUM CHLORIDE, CALCIUM CHLORIDE 600; 310; 30; 20 MG/100ML; MG/100ML; MG/100ML; MG/100ML
1000 INJECTION, SOLUTION INTRAVENOUS CONTINUOUS
Status: DISCONTINUED | OUTPATIENT
Start: 2020-11-11 | End: 2020-11-12

## 2020-11-11 RX ORDER — LIDOCAINE HYDROCHLORIDE 40 MG/ML
5 SOLUTION TOPICAL
Status: COMPLETED | OUTPATIENT
Start: 2020-11-11 | End: 2020-11-12

## 2020-11-11 RX ADMIN — SODIUM CHLORIDE, POTASSIUM CHLORIDE, SODIUM LACTATE AND CALCIUM CHLORIDE 1000 ML: 600; 310; 30; 20 INJECTION, SOLUTION INTRAVENOUS at 22:10

## 2020-11-11 RX ADMIN — IOPAMIDOL 92 ML: 755 INJECTION, SOLUTION INTRAVENOUS at 22:57

## 2020-11-11 RX ADMIN — HYDROMORPHONE HYDROCHLORIDE 1 MG: 1 INJECTION, SOLUTION INTRAMUSCULAR; INTRAVENOUS; SUBCUTANEOUS at 22:07

## 2020-11-11 RX ADMIN — SODIUM CHLORIDE, POTASSIUM CHLORIDE, SODIUM LACTATE AND CALCIUM CHLORIDE 1000 ML: 600; 310; 30; 20 INJECTION, SOLUTION INTRAVENOUS at 23:03

## 2020-11-11 RX ADMIN — ONDANSETRON 4 MG: 2 INJECTION INTRAMUSCULAR; INTRAVENOUS at 22:07

## 2020-11-11 ASSESSMENT — ENCOUNTER SYMPTOMS
NAUSEA: 1
CONSTIPATION: 1
VOMITING: 1
ABDOMINAL PAIN: 1
FEVER: 0
COUGH: 0

## 2020-11-11 ASSESSMENT — MIFFLIN-ST. JEOR: SCORE: 1641.65

## 2020-11-11 NOTE — LETTER
[facility] [address]  [address] [address]  Phone: [phone]    November 13, 2020        Johnnie Sahni  [address] [facility]  [address]  [place] [address]          To whom it may concern:    RE: Johnnie Sahni initially had surgery on 11/9/2020. He was subsequently admitted to the hospital on 11/11/2020. He had surgery again on 11/12/2020 and was ready for discharge on 11/13/2020. Since 11/9/2020 he has required opiates for pain control which alters cognition and memory. He may require additional opiates for over a week for pain management. During this time, he must be excused from completing examinations and school work or other work that will impact his course grades.     Please contact me for questions or concerns.    Phone: [phone]      Sincerely,      Christin Little MD  General Surgery, PGY-1

## 2020-11-11 NOTE — LETTER
[facility] [address]  [address] [facility] [address]  Phone: [phone]    November 13, 2020        Johnnie Sahni  [address] [facility]  [address]  [place] [address]        To whom it may concern:    RE: Johnnie Sahni was admitted on 11/11/2020. He had surgery on 11/12/2020 and was ready for discharge on 11/13/2020 however he is requiring opiates for pain control which alters cognition and memory. He may require opiates for over a week for pain management. During this time, he must be excused from completing examinations and school work or other work that will impact his course grades.     Please contact me for questions or concerns.    Phone: [phone]    Sincerely,    Christin Little MD  General Surgery, PGY-1

## 2020-11-11 NOTE — PROGRESS NOTES
Johnnie Sahni is a patient of Dr. Paul Farah that underwent robotic BIH ([facility]) approximately 2 days ago (11/9).  Attempted to contact patient via telephone for a status update and review post op teaching.  LM on VM to call office.  Await return call.      Of note:  Wound:  Steri-strips  Follow-up:  Routine  Restrictions:  - No activity restrictions  New medications:  Norco, Valium, Senna (Norco and Valium filled 11/10)  Equipment/Supplies:  [employer]    Spoke with the patient yesterday- in a lot of post operative pain.  Rx Norco refilled and Valium ordered.  Encouraged heat/ice, IBU.    ADDENDUM  11/12/20  9:54 AM  Chart reviewed as telephone call not returned.  Patient is admitted to the hospital with SBO.  Will follow peripherally.      
Normal Screen- (No follow-up needed)

## 2020-11-12 ENCOUNTER — ANESTHESIA EVENT (OUTPATIENT)
Dept: SURGERY | Facility: CLINIC | Age: 30
End: 2020-11-12
Payer: COMMERCIAL

## 2020-11-12 ENCOUNTER — ANESTHESIA (OUTPATIENT)
Dept: SURGERY | Facility: CLINIC | Age: 30
End: 2020-11-12
Payer: COMMERCIAL

## 2020-11-12 ENCOUNTER — APPOINTMENT (OUTPATIENT)
Dept: GENERAL RADIOLOGY | Facility: CLINIC | Age: 30
End: 2020-11-12
Attending: EMERGENCY MEDICINE
Payer: COMMERCIAL

## 2020-11-12 PROBLEM — K56.609 SBO (SMALL BOWEL OBSTRUCTION) (H): Status: ACTIVE | Noted: 2020-11-12

## 2020-11-12 LAB
GLUCOSE BLDC GLUCOMTR-MCNC: 101 MG/DL (ref 70–99)
LACTATE BLD-SCNC: 1 MMOL/L (ref 0.7–2)
MAGNESIUM SERPL-MCNC: 2 MG/DL (ref 1.6–2.3)
POTASSIUM SERPL-SCNC: 3.6 MMOL/L (ref 3.4–5.3)
SARS-COV-2 RNA SPEC QL NAA+PROBE: NOT DETECTED
SPECIMEN SOURCE: NORMAL

## 2020-11-12 PROCEDURE — 250N000013 HC RX MED GY IP 250 OP 250 PS 637: Performed by: EMERGENCY MEDICINE

## 2020-11-12 PROCEDURE — 360N000022 HC SURGERY LEVEL 3 1ST 30 MIN - UMMC: Performed by: SURGERY

## 2020-11-12 PROCEDURE — 250N000011 HC RX IP 250 OP 636: Performed by: NURSE ANESTHETIST, CERTIFIED REGISTERED

## 2020-11-12 PROCEDURE — 0DQV4ZZ REPAIR MESENTERY, PERCUTANEOUS ENDOSCOPIC APPROACH: ICD-10-PCS | Performed by: SURGERY

## 2020-11-12 PROCEDURE — 999N000065 XR ABDOMEN PORT 1 VW

## 2020-11-12 PROCEDURE — 272N000001 HC OR GENERAL SUPPLY STERILE: Performed by: SURGERY

## 2020-11-12 PROCEDURE — 0DQW4ZZ REPAIR PERITONEUM, PERCUTANEOUS ENDOSCOPIC APPROACH: ICD-10-PCS | Performed by: SURGERY

## 2020-11-12 PROCEDURE — 96376 TX/PRO/DX INJ SAME DRUG ADON: CPT | Performed by: EMERGENCY MEDICINE

## 2020-11-12 PROCEDURE — 44238 UNLISTED LAPS PX INTESTINE: CPT | Mod: 78 | Performed by: SURGERY

## 2020-11-12 PROCEDURE — 258N000003 HC RX IP 258 OP 636: Performed by: ANESTHESIOLOGY

## 2020-11-12 PROCEDURE — 250N000013 HC RX MED GY IP 250 OP 250 PS 637: Performed by: STUDENT IN AN ORGANIZED HEALTH CARE EDUCATION/TRAINING PROGRAM

## 2020-11-12 PROCEDURE — 761N000003 HC RECOVERY PHASE 1 LEVEL 2 FIRST HR: Performed by: SURGERY

## 2020-11-12 PROCEDURE — 250N000011 HC RX IP 250 OP 636: Performed by: ANESTHESIOLOGY

## 2020-11-12 PROCEDURE — 360N000023 HC SURGERY LEVEL 3 EA 15 ADDTL MIN UMMC: Performed by: SURGERY

## 2020-11-12 PROCEDURE — 83605 ASSAY OF LACTIC ACID: CPT | Performed by: STUDENT IN AN ORGANIZED HEALTH CARE EDUCATION/TRAINING PROGRAM

## 2020-11-12 PROCEDURE — 250N000003 HC SEVOFLURANE, EA 15 MIN: Performed by: SURGERY

## 2020-11-12 PROCEDURE — 258N000003 HC RX IP 258 OP 636: Performed by: NURSE ANESTHETIST, CERTIFIED REGISTERED

## 2020-11-12 PROCEDURE — 83735 ASSAY OF MAGNESIUM: CPT | Performed by: STUDENT IN AN ORGANIZED HEALTH CARE EDUCATION/TRAINING PROGRAM

## 2020-11-12 PROCEDURE — U0003 INFECTIOUS AGENT DETECTION BY NUCLEIC ACID (DNA OR RNA); SEVERE ACUTE RESPIRATORY SYNDROME CORONAVIRUS 2 (SARS-COV-2) (CORONAVIRUS DISEASE [COVID-19]), AMPLIFIED PROBE TECHNIQUE, MAKING USE OF HIGH THROUGHPUT TECHNOLOGIES AS DESCRIBED BY CMS-2020-01-R: HCPCS | Performed by: EMERGENCY MEDICINE

## 2020-11-12 PROCEDURE — 74018 RADEX ABDOMEN 1 VIEW: CPT | Mod: 26 | Performed by: RADIOLOGY

## 2020-11-12 PROCEDURE — 250N000009 HC RX 250: Performed by: NURSE ANESTHETIST, CERTIFIED REGISTERED

## 2020-11-12 PROCEDURE — 258N000003 HC RX IP 258 OP 636: Performed by: STUDENT IN AN ORGANIZED HEALTH CARE EDUCATION/TRAINING PROGRAM

## 2020-11-12 PROCEDURE — 370N000002 HC ANESTHESIA TECHNICAL FEE, EACH ADDTL 15 MIN: Performed by: SURGERY

## 2020-11-12 PROCEDURE — 999N001017 HC STATISTIC GLUCOSE BY METER IP

## 2020-11-12 PROCEDURE — 36415 COLL VENOUS BLD VENIPUNCTURE: CPT | Performed by: STUDENT IN AN ORGANIZED HEALTH CARE EDUCATION/TRAINING PROGRAM

## 2020-11-12 PROCEDURE — 120N000002 HC R&B MED SURG/OB UMMC

## 2020-11-12 PROCEDURE — 250N000011 HC RX IP 250 OP 636: Performed by: EMERGENCY MEDICINE

## 2020-11-12 PROCEDURE — 84132 ASSAY OF SERUM POTASSIUM: CPT | Performed by: STUDENT IN AN ORGANIZED HEALTH CARE EDUCATION/TRAINING PROGRAM

## 2020-11-12 PROCEDURE — 999N000139 HC STATISTIC PRE-PROCEDURE ASSESSMENT II: Performed by: SURGERY

## 2020-11-12 PROCEDURE — 44238 UNLISTED LAPS PX INTESTINE: CPT | Mod: GC | Performed by: SURGERY

## 2020-11-12 PROCEDURE — 250N000009 HC RX 250: Performed by: EMERGENCY MEDICINE

## 2020-11-12 PROCEDURE — 370N000001 HC ANESTHESIA TECHNICAL FEE, 1ST 30 MIN: Performed by: SURGERY

## 2020-11-12 RX ORDER — SODIUM CHLORIDE, SODIUM LACTATE, POTASSIUM CHLORIDE, CALCIUM CHLORIDE 600; 310; 30; 20 MG/100ML; MG/100ML; MG/100ML; MG/100ML
INJECTION, SOLUTION INTRAVENOUS CONTINUOUS PRN
Status: DISCONTINUED | OUTPATIENT
Start: 2020-11-12 | End: 2020-11-12

## 2020-11-12 RX ORDER — ONDANSETRON 2 MG/ML
INJECTION INTRAMUSCULAR; INTRAVENOUS PRN
Status: DISCONTINUED | OUTPATIENT
Start: 2020-11-12 | End: 2020-11-12

## 2020-11-12 RX ORDER — CEFAZOLIN SODIUM 2 G/100ML
2 INJECTION, SOLUTION INTRAVENOUS
Status: CANCELLED | OUTPATIENT
Start: 2020-11-12

## 2020-11-12 RX ORDER — OXYCODONE HYDROCHLORIDE 5 MG/1
5 TABLET ORAL
Qty: 12 TABLET | Refills: 0 | Status: SHIPPED | OUTPATIENT
Start: 2020-11-12

## 2020-11-12 RX ORDER — FENTANYL CITRATE 50 UG/ML
INJECTION, SOLUTION INTRAMUSCULAR; INTRAVENOUS PRN
Status: DISCONTINUED | OUTPATIENT
Start: 2020-11-12 | End: 2020-11-12

## 2020-11-12 RX ORDER — MEPERIDINE HYDROCHLORIDE 25 MG/ML
12.5 INJECTION INTRAMUSCULAR; INTRAVENOUS; SUBCUTANEOUS
Status: DISCONTINUED | OUTPATIENT
Start: 2020-11-12 | End: 2020-11-12 | Stop reason: HOSPADM

## 2020-11-12 RX ORDER — SODIUM CHLORIDE, SODIUM LACTATE, POTASSIUM CHLORIDE, CALCIUM CHLORIDE 600; 310; 30; 20 MG/100ML; MG/100ML; MG/100ML; MG/100ML
1000 INJECTION, SOLUTION INTRAVENOUS CONTINUOUS
Status: DISCONTINUED | OUTPATIENT
Start: 2020-11-12 | End: 2020-11-13

## 2020-11-12 RX ORDER — ONDANSETRON 2 MG/ML
4 INJECTION INTRAMUSCULAR; INTRAVENOUS EVERY 30 MIN PRN
Status: DISCONTINUED | OUTPATIENT
Start: 2020-11-12 | End: 2020-11-12 | Stop reason: HOSPADM

## 2020-11-12 RX ORDER — HYDROMORPHONE HYDROCHLORIDE 1 MG/ML
.3-.5 INJECTION, SOLUTION INTRAMUSCULAR; INTRAVENOUS; SUBCUTANEOUS EVERY 10 MIN PRN
Status: DISCONTINUED | OUTPATIENT
Start: 2020-11-12 | End: 2020-11-12 | Stop reason: HOSPADM

## 2020-11-12 RX ORDER — NALOXONE HYDROCHLORIDE 0.4 MG/ML
.1-.4 INJECTION, SOLUTION INTRAMUSCULAR; INTRAVENOUS; SUBCUTANEOUS
Status: DISCONTINUED | OUTPATIENT
Start: 2020-11-12 | End: 2020-11-12 | Stop reason: HOSPADM

## 2020-11-12 RX ORDER — PROPOFOL 10 MG/ML
INJECTION, EMULSION INTRAVENOUS PRN
Status: DISCONTINUED | OUTPATIENT
Start: 2020-11-12 | End: 2020-11-12

## 2020-11-12 RX ORDER — PROCHLORPERAZINE 25 MG
25 SUPPOSITORY, RECTAL RECTAL EVERY 12 HOURS PRN
Status: DISCONTINUED | OUTPATIENT
Start: 2020-11-12 | End: 2020-11-13 | Stop reason: HOSPADM

## 2020-11-12 RX ORDER — ONDANSETRON 4 MG/1
4 TABLET, ORALLY DISINTEGRATING ORAL EVERY 6 HOURS PRN
Status: DISCONTINUED | OUTPATIENT
Start: 2020-11-12 | End: 2020-11-13 | Stop reason: HOSPADM

## 2020-11-12 RX ORDER — LIDOCAINE HYDROCHLORIDE 20 MG/ML
INJECTION, SOLUTION INFILTRATION; PERINEURAL PRN
Status: DISCONTINUED | OUTPATIENT
Start: 2020-11-12 | End: 2020-11-12

## 2020-11-12 RX ORDER — ONDANSETRON 4 MG/1
4 TABLET, ORALLY DISINTEGRATING ORAL EVERY 6 HOURS PRN
Qty: 20 TABLET | Refills: 0 | Status: SHIPPED | OUTPATIENT
Start: 2020-11-12

## 2020-11-12 RX ORDER — ONDANSETRON 4 MG/1
4 TABLET, ORALLY DISINTEGRATING ORAL EVERY 30 MIN PRN
Status: DISCONTINUED | OUTPATIENT
Start: 2020-11-12 | End: 2020-11-12 | Stop reason: HOSPADM

## 2020-11-12 RX ORDER — LIDOCAINE 40 MG/G
CREAM TOPICAL
Status: DISCONTINUED | OUTPATIENT
Start: 2020-11-12 | End: 2020-11-13 | Stop reason: HOSPADM

## 2020-11-12 RX ORDER — CEFAZOLIN SODIUM 2 G/100ML
INJECTION, SOLUTION INTRAVENOUS PRN
Status: DISCONTINUED | OUTPATIENT
Start: 2020-11-12 | End: 2020-11-12

## 2020-11-12 RX ORDER — ACETAMINOPHEN 500 MG
1000 TABLET ORAL 3 TIMES DAILY
Qty: 1 BOTTLE | Refills: 0 | Status: SHIPPED | OUTPATIENT
Start: 2020-11-12

## 2020-11-12 RX ORDER — OXYCODONE HYDROCHLORIDE 5 MG/1
5 TABLET ORAL
Status: DISCONTINUED | OUTPATIENT
Start: 2020-11-12 | End: 2020-11-13 | Stop reason: HOSPADM

## 2020-11-12 RX ORDER — SODIUM CHLORIDE, SODIUM LACTATE, POTASSIUM CHLORIDE, CALCIUM CHLORIDE 600; 310; 30; 20 MG/100ML; MG/100ML; MG/100ML; MG/100ML
INJECTION, SOLUTION INTRAVENOUS CONTINUOUS
Status: DISCONTINUED | OUTPATIENT
Start: 2020-11-12 | End: 2020-11-12 | Stop reason: HOSPADM

## 2020-11-12 RX ORDER — OXYCODONE HYDROCHLORIDE 5 MG/1
5 TABLET ORAL
Qty: 12 TABLET | Refills: 0 | Status: SHIPPED | OUTPATIENT
Start: 2020-11-12 | End: 2020-11-12

## 2020-11-12 RX ORDER — DEXAMETHASONE SODIUM PHOSPHATE 4 MG/ML
INJECTION, SOLUTION INTRA-ARTICULAR; INTRALESIONAL; INTRAMUSCULAR; INTRAVENOUS; SOFT TISSUE PRN
Status: DISCONTINUED | OUTPATIENT
Start: 2020-11-12 | End: 2020-11-12

## 2020-11-12 RX ORDER — FENTANYL CITRATE 50 UG/ML
25-50 INJECTION, SOLUTION INTRAMUSCULAR; INTRAVENOUS
Status: DISCONTINUED | OUTPATIENT
Start: 2020-11-12 | End: 2020-11-12 | Stop reason: HOSPADM

## 2020-11-12 RX ORDER — PROCHLORPERAZINE MALEATE 5 MG
10 TABLET ORAL EVERY 6 HOURS PRN
Status: DISCONTINUED | OUTPATIENT
Start: 2020-11-12 | End: 2020-11-13 | Stop reason: HOSPADM

## 2020-11-12 RX ORDER — CEFAZOLIN SODIUM 1 G/3ML
1 INJECTION, POWDER, FOR SOLUTION INTRAMUSCULAR; INTRAVENOUS SEE ADMIN INSTRUCTIONS
Status: CANCELLED | OUTPATIENT
Start: 2020-11-12

## 2020-11-12 RX ORDER — ONDANSETRON 2 MG/ML
4 INJECTION INTRAMUSCULAR; INTRAVENOUS EVERY 6 HOURS PRN
Status: DISCONTINUED | OUTPATIENT
Start: 2020-11-12 | End: 2020-11-13 | Stop reason: HOSPADM

## 2020-11-12 RX ORDER — ACETAMINOPHEN 500 MG
1000 TABLET ORAL 3 TIMES DAILY
Status: DISCONTINUED | OUTPATIENT
Start: 2020-11-12 | End: 2020-11-13 | Stop reason: HOSPADM

## 2020-11-12 RX ADMIN — HYDROMORPHONE HYDROCHLORIDE 1 MG: 1 INJECTION, SOLUTION INTRAMUSCULAR; INTRAVENOUS; SUBCUTANEOUS at 00:22

## 2020-11-12 RX ADMIN — SODIUM CHLORIDE, POTASSIUM CHLORIDE, SODIUM LACTATE AND CALCIUM CHLORIDE 1000 ML: 600; 310; 30; 20 INJECTION, SOLUTION INTRAVENOUS at 06:20

## 2020-11-12 RX ADMIN — HYDROMORPHONE HYDROCHLORIDE 0.3 MG: 1 INJECTION, SOLUTION INTRAMUSCULAR; INTRAVENOUS; SUBCUTANEOUS at 12:58

## 2020-11-12 RX ADMIN — MIDAZOLAM 2 MG: 1 INJECTION INTRAMUSCULAR; INTRAVENOUS at 10:32

## 2020-11-12 RX ADMIN — ONDANSETRON 4 MG: 2 INJECTION INTRAMUSCULAR; INTRAVENOUS at 10:50

## 2020-11-12 RX ADMIN — ONDANSETRON 4 MG: 2 INJECTION INTRAMUSCULAR; INTRAVENOUS at 00:22

## 2020-11-12 RX ADMIN — BENZOCAINE AND MENTHOL 1 LOZENGE: 15; 3.6 LOZENGE ORAL at 03:55

## 2020-11-12 RX ADMIN — LIDOCAINE HYDROCHLORIDE 60 MG: 20 INJECTION, SOLUTION INFILTRATION; PERINEURAL at 10:40

## 2020-11-12 RX ADMIN — HYDROMORPHONE HYDROCHLORIDE 1 MG: 1 INJECTION, SOLUTION INTRAMUSCULAR; INTRAVENOUS; SUBCUTANEOUS at 03:56

## 2020-11-12 RX ADMIN — ROCURONIUM BROMIDE 65 MG: 10 INJECTION INTRAVENOUS at 10:40

## 2020-11-12 RX ADMIN — FENTANYL CITRATE 50 MCG: 50 INJECTION, SOLUTION INTRAMUSCULAR; INTRAVENOUS at 10:40

## 2020-11-12 RX ADMIN — BENZOCAINE AND MENTHOL 1 LOZENGE: 15; 3.6 LOZENGE ORAL at 06:23

## 2020-11-12 RX ADMIN — OXYMETAZOLINE HYDROCHLORIDE 2 SPRAY: 0.05 SPRAY NASAL at 00:19

## 2020-11-12 RX ADMIN — PROPOFOL 200 MG: 10 INJECTION, EMULSION INTRAVENOUS at 10:40

## 2020-11-12 RX ADMIN — SUGAMMADEX 200 MG: 100 INJECTION, SOLUTION INTRAVENOUS at 12:00

## 2020-11-12 RX ADMIN — OXYCODONE HYDROCHLORIDE 5 MG: 5 TABLET ORAL at 18:45

## 2020-11-12 RX ADMIN — Medication 2 G: at 10:50

## 2020-11-12 RX ADMIN — SODIUM CHLORIDE, POTASSIUM CHLORIDE, SODIUM LACTATE AND CALCIUM CHLORIDE: 600; 310; 30; 20 INJECTION, SOLUTION INTRAVENOUS at 12:48

## 2020-11-12 RX ADMIN — SODIUM CHLORIDE, POTASSIUM CHLORIDE, SODIUM LACTATE AND CALCIUM CHLORIDE 1000 ML: 600; 310; 30; 20 INJECTION, SOLUTION INTRAVENOUS at 03:38

## 2020-11-12 RX ADMIN — SODIUM CHLORIDE, POTASSIUM CHLORIDE, SODIUM LACTATE AND CALCIUM CHLORIDE: 600; 310; 30; 20 INJECTION, SOLUTION INTRAVENOUS at 10:28

## 2020-11-12 RX ADMIN — DEXAMETHASONE SODIUM PHOSPHATE 6 MG: 4 INJECTION, SOLUTION INTRA-ARTICULAR; INTRALESIONAL; INTRAMUSCULAR; INTRAVENOUS; SOFT TISSUE at 10:49

## 2020-11-12 RX ADMIN — OXYCODONE HYDROCHLORIDE 5 MG: 5 TABLET ORAL at 15:43

## 2020-11-12 RX ADMIN — OXYCODONE HYDROCHLORIDE 5 MG: 5 TABLET ORAL at 21:47

## 2020-11-12 RX ADMIN — LIDOCAINE HYDROCHLORIDE 5 ML: 40 SOLUTION TOPICAL at 00:19

## 2020-11-12 RX ADMIN — PHENYLEPHRINE HYDROCHLORIDE 100 MCG: 10 INJECTION INTRAVENOUS at 10:49

## 2020-11-12 ASSESSMENT — ACTIVITIES OF DAILY LIVING (ADL)
ADLS_ACUITY_SCORE: 14
ADLS_ACUITY_SCORE: 14
ADLS_ACUITY_SCORE: 17

## 2020-11-12 ASSESSMENT — MIFFLIN-ST. JEOR: SCORE: 1620.33

## 2020-11-12 NOTE — ED TRIAGE NOTES
Pt arrived to the ER with c/o post op nausea/vomiting. Pt had bilat inguinal hernia surgery on Monday 11/9/20. Pt has been taking norco, zofran and valium as prescribed. Unable to keep much fluids down. VSS and afebrile.

## 2020-11-12 NOTE — ANESTHESIA CARE TRANSFER NOTE
Patient: Johnnie Sahni    Procedure(s):  LAPAROSCOPY, DIAGNOSTIC and repair preitoneal defect    Diagnosis: Small bowel obstruction (H) [K56.609]  Diagnosis Additional Information: No value filed.    Anesthesia Type:   General     Note:  Airway :Nasal Cannula  Patient transferred to:PACU  Comments: To PACU on supplemental O2 with + air exchange, placed on monitor. Report given to RN, questions answered, VSS, patient alert and comfortable.Handoff Report: Identifed the Patient, Identified the Reponsible Provider, Reviewed the pertinent medical history, Discussed the surgical course, Reviewed Intra-OP anesthesia mangement and issues during anesthesia, Set expectations for post-procedure period and Allowed opportunity for questions and acknowledgement of understanding      Vitals: (Last set prior to Anesthesia Care Transfer)    CRNA VITALS  11/12/2020 1157 - 11/12/2020 1230      11/12/2020             Pulse:  69    Ht Rate:  62    SpO2:  100 %    Resp Rate (observed):  12            Electronically Signed By: AMBER Peterson CRNA  November 12, 2020  12:30 PM

## 2020-11-12 NOTE — BRIEF OP NOTE
[facility] Brief Operative Note    Pre-operative diagnosis: Small bowel obstruction (H) [K56.609]   Post-operative diagnosis Same as Pre-op Dx   Procedure: Procedure(s):  LAPAROSCOPY, DIAGNOSTIC and repair preitoneal defect   Surgeon: Paul Farah MD   Assistants(s): Eliezer Celeste MD   Estimated blood loss: 1 mL    Specimens: none   Findings: Yes, small internal hernia from peritoneal defect at the right inguinal site. Small bowel reduced, inspected, all viable. Defect closed primarily.

## 2020-11-12 NOTE — PLAN OF CARE
Arrived from ED at 0500.  AVSS, alert/oriented, UAL in room.  NPO, NG in place, to LIS, scant amt out.  Educated on NG/bowel rest and activity for small bowel obstruction.   Unable to void, straight cathed for 650cc robert urine.  Abdomen non-distended, unable to hear bowel sounds, not passing gas, denies nausea.  Denies abdominal pain, most discomfort is from the NG tube.  MIVF at 125.

## 2020-11-12 NOTE — OR NURSING
Spoke with ID lab, COVID swab was received and sent for non-emergent PCR testing, results will not be available until specimen is completed later this afternoon.

## 2020-11-12 NOTE — OR NURSING
Dr. BEN Rangel notified patient ready to transfer to [address]C.  Dr. Rangel will enter anesthesia sign out.

## 2020-11-12 NOTE — ANESTHESIA PREPROCEDURE EVALUATION
"Anesthesia Pre-Procedure Evaluation    Patient: Johnnie Sahni   MRN:     [id number] Gender:   male   Age:    30 year old :      1990        Preoperative Diagnosis: Small bowel obstruction (H) [K56.609]   Procedure(s):  LAPAROSCOPY, DIAGNOSTIC, BY GENERAL SURGERY     LABS:  CBC:   Lab Results   Component Value Date    WBC 11.3 (H) 2020    HGB 18.1 (H) 2020    HCT 54.4 (H) 2020     2020     BMP:   Lab Results   Component Value Date     2020    POTASSIUM 3.6 2020    POTASSIUM 3.8 2020    CHLORIDE 98 2020    CO2 29 2020    BUN 19 2020    CR 1.25 2020     (H) 2020     COAGS: No results found for: PTT, INR, FIBR  POC: No results found for: BGM, HCG, HCGS  OTHER:   Lab Results   Component Value Date    LACT 1.0 2020    SANTOS 10.3 (H) 2020    MAG 2.0 2020    ALBUMIN 4.1 2020    PROTTOTAL 8.8 2020    ALT 19 2020    AST 16 2020    ALKPHOS 73 2020    BILITOTAL 0.9 2020        Preop Vitals    BP Readings from Last 3 Encounters:   20 121/74   10/21/20 111/75   09/15/20 125/75    Pulse Readings from Last 3 Encounters:   20 57   10/21/20 81   09/15/20 58      Resp Readings from Last 3 Encounters:   20 16   10/21/20 16    SpO2 Readings from Last 3 Encounters:   20 98%   10/21/20 97%   09/15/20 97%      Temp Readings from Last 1 Encounters:   20 37  C (98.6  F) (Oral)    Ht Readings from Last 1 Encounters:   20 1.778 m (5' 10\")      Wt Readings from Last 1 Encounters:   20 65.9 kg (145 lb 4.8 oz)    Estimated body mass index is 20.85 kg/m  as calculated from the following:    Height as of this encounter: 1.778 m (5' 10\").    Weight as of this encounter: 65.9 kg (145 lb 4.8 oz).     LDA:  Peripheral IV 20 Right Upper arm (Active)   Site Assessment WDL 20 0500   Line Status Saline locked 20 0500   Phlebitis Scale 0-->no " symptoms 11/12/20 0500   Infiltration Scale 0 11/12/20 0500   Number of days: 1       NG/OG/NJ Tube Nasogastric 16 fr Left nostril (Active)   Site Description WDL 11/12/20 0540   Status Suction-low intermittent 11/12/20 0540   Drainage Appearance Brown;Green 11/12/20 0540   Placement Confirmation Aspiration of gastric content 11/12/20 0540   Output (ml) 625 ml 11/12/20 0253   Number of days: 0        History reviewed. No pertinent past medical history.   Past Surgical History:   Procedure Laterality Date     HERNIA REPAIR Bilateral 11/09/2020    [place]      No Known Allergies     Anesthesia Evaluation     .             ROS/MED HX    ENT/Pulmonary:  - neg pulmonary ROS     Neurologic:  - neg neurologic ROS     Cardiovascular:  - neg cardiovascular ROS       METS/Exercise Tolerance:     Hematologic:         Musculoskeletal:  - neg musculoskeletal ROS       GI/Hepatic: Comment: SBO  S/p bilateral ingunial hernia repair        Renal/Genitourinary:  - ROS Renal section negative       Endo:  - neg endo ROS       Psychiatric:  - neg psychiatric ROS       Infectious Disease:         Malignancy:      - no malignancy   Other:    - neg other ROS                     PHYSICAL EXAM:   Mental Status/Neuro: A/A/O   Airway: Facies: Feasible  Mallampati: I  Mouth/Opening: Full  TM distance: > 6 cm  Neck ROM: Full   Respiratory: Auscultation: CTAB     Resp. Rate: Normal     Resp. Effort: Normal      CV: Rhythm: Regular  Rate: Age appropriate  Heart: Normal Sounds  Edema: None   Comments:      Dental: Normal Dentition                Assessment:   ASA SCORE: 2    H&P: History and physical reviewed and following examination; no interval change.   Smoking Status:  Non-Smoker/Unknown   NPO Status: NPO Appropriate     Plan:   Anes. Type:  General   Pre-Medication: None   Induction:  IV (RSI)   Airway: ETT; Oral   Access/Monitoring: PIV; 2nd PIV   Maintenance: Balanced     Postop Plan:   Postop Pain: Opioids  Postop Sedation/Airway: Not  planned     PONV Management:   Adult Risk Factors:, Non-Smoker, Postop Opioids   Prevention: Ondansetron     CONSENT: Direct conversation   Plan and risks discussed with: Patient   Blood Products: Consent Deferred (Minimal Blood Loss)                   Bertrand Colorado DO

## 2020-11-12 NOTE — BRIEF OP NOTE
[facility]     Brief Operative Note    Pre-operative diagnosis: Small bowel obstruction (H) [K56.609]  Post-operative diagnosis Same as pre-operative diagnosis    Procedure: Procedure(s):  LAPAROSCOPY, DIAGNOSTIC and repair preitoneal defect  Surgeon: Surgeon(s) and Role:     * Paul Farah MD - Primary  Anesthesia: General   Estimated blood loss: 5 ml  Drains: None  Specimens: * No specimens in log *  Findings:   Small bowel herniated within the preperitoneal space. Reduced and found to be viable. Peritoneum closed.   Complications: None.  Implants: * No implants in log *

## 2020-11-12 NOTE — ANESTHESIA POSTPROCEDURE EVALUATION
Anesthesia POST Procedure Evaluation    Patient: Johnnie Sahni   MRN:     [id number] Gender:   male   Age:    30 year old :      1990        Preoperative Diagnosis: Small bowel obstruction (H) [K56.609]   Procedure(s):  LAPAROSCOPY, DIAGNOSTIC and repair preitoneal defect   Postop Comments: No value filed.     Anesthesia Type: General       Disposition: Admission   Postop Pain Control: Uneventful            Sign Out: Well controlled pain   PONV: No   Neuro/Psych: Uneventful            Sign Out: Acceptable/Baseline neuro status   Airway/Respiratory: Uneventful            Sign Out: Acceptable/Baseline resp. status   CV/Hemodynamics: Uneventful            Sign Out: Acceptable CV status   Other NRE: NONE   DID A NON-ROUTINE EVENT OCCUR? No         Last Anesthesia Record Vitals:  CRNA VITALS  2020 1157 - 2020 1257      2020             Pulse:  69    Ht Rate:  62    SpO2:  100 %    Resp Rate (observed):  12          Last PACU Vitals:  Vitals Value Taken Time   /77 20 1320   Temp 37.4  C (99.32  F) 20 1321   Pulse 61 20 1321   Resp 16 20 1310   SpO2 100 % 20 1321   Temp src     NIBP     Pulse     SpO2     Resp     Temp     Ht Rate     Temp 2     Vitals shown include unvalidated device data.      Electronically Signed By: Demi Rangel MD, 2020, 1:22 PM

## 2020-11-12 NOTE — DISCHARGE SUMMARY
"[facility]   General Surgery Discharge Summary    Date of Admission: 11/11/2020  Date of Discharge: 11/13/2020    Admission Diagnosis:  Small bowel obstruction (H) [K56.609]  SBO (small bowel obstruction) (H) [K56.609]    Discharge Diagnosis:  1. Same as above    Consultations:  None    Procedures:  Exploratory laparoscopy, reduction of internal hernia with repair of peritoneal defect.     Brief HPI:  Johnnie Sahni is a 30 year old year old male with recent bilateral inguinal hernia repair (11/9) who presented with nausea, emesis, and lower abdominal pain for the past 2 days. He has been taking Norco for the pain and began taking Vicodin on 11/11 with limited pain relief. The pain was localized to the lower abdomen and rated the pain at a 9/10 at it's worst. He attempted to eat food after surgery, but experienced nausea and repeated vomiting that consisted of food and green liquid. He continued to vomit despite taking Zofran and has was unable to keep down any food or liquid and was struggling to urinate with weak stream. He was admitted and taken to the OR.      Hospital Course:  The patient was admitted and taken to the OR. He was found to have a small bowel obstruction secondary to internal hernia. The following procedure was completed: Exploratory laparoscopy, reduction of internal hernia with repair of peritoneal defect. The patient tolerated the procedure well. There were no complications. The patient's diet was slowly advanced as bowel function returned. Pain was controlled with oral pain medication and the patient was able to ambulate and void without difficulty. The patient received appropriate education post operatively. On November 13, 2020 the patient was discharged to home.     Discharge Physical Exam:  /71 (BP Location: Left arm)   Pulse 63   Temp 98.3  F (36.8  C) (Axillary)   Resp 11   Ht 1.778 m (5' 10\")   Wt 65.9 kg (145 lb 4.8 oz)   SpO2 98%   BMI " 20.85 kg/m      Gen: Alert, oriented, resting comfortably in bed   Lungs: non-labored breathing on room air  CV: regular rhythm, normal rate   Abd: soft, non-distended, appropriately tender, incisions are c/d/i covered in steri-strips   Ext: no peripheral edema  Neuro: CNII-XII grossly intact    Meds:       Review of your medicines      START taking      Dose / Directions   acetaminophen 500 MG tablet  Commonly known as: TYLENOL      Dose: 1,000 mg  Take 2 tablets (1,000 mg) by mouth 3 times daily  Quantity: 1 Bottle  Refills: 0     ondansetron 4 MG ODT tab  Commonly known as: ZOFRAN-ODT      Dose: 4 mg  Take 1 tablet (4 mg) by mouth every 6 hours as needed for nausea or vomiting  Quantity: 20 tablet  Refills: 0     oxyCODONE 5 MG tablet  Commonly known as: ROXICODONE      Dose: 5 mg  Take 1 tablet (5 mg) by mouth every 3 hours as needed for moderate to severe pain  Quantity: 12 tablet  Refills: 0        CONTINUE these medicines which have NOT CHANGED      Dose / Directions   polyethylene glycol 17 g packet  Commonly known as: MIRALAX      Dose: 1 packet  Take 1 packet by mouth daily  Refills: 0        STOP taking    diazepam 5 MG tablet  Commonly known as: Valium        HYDROcodone-acetaminophen 5-325 MG tablet  Commonly known as: NORCO        ondansetron 4 MG tablet  Commonly known as: Zofran              Where to get your medicines      These medications were sent to [facility] - [place], [place] - [address] [address]    Phone: [phone]     acetaminophen 500 MG tablet    ondansetron 4 MG ODT tab     Some of these will need a paper prescription and others can be bought over the counter. Ask your nurse if you have questions.    Bring a paper prescription for each of these medications    oxyCODONE 5 MG tablet         Additional instructions:  After Care     Future Labs/Procedures    Activity     Comments:    Your activity upon discharge: activity as  tolerated    Diet     Comments:    Follow this diet upon discharge: Orders Placed This Encounter  Advance diet            Follow Up:    -Follow up with general surgery 2 week(s) after discharge.     GENERAL SURGERY PATIENTS: Call [phone] for scheduling needs or to reach your care team    The patient was discussed with the chief resident and staff on the day of discharge     Christin Little MD  General Surgery, PGY-1

## 2020-11-12 NOTE — OP NOTE
Procedure Date: 11/12/2020      PREOPERATIVE DIAGNOSIS:  Small bowel obstruction secondary to internal hernia after laparoscopic repair of inguinal hernia pocket.        POSTOPERATIVE DIAGNOSIS:  Small bowel obstruction secondary to internal hernia after laparoscopic repair of inguinal hernia pocket.        OPERATIVE PROCEDURE:  Exploratory laparoscopy, reduction of internal hernia with repair of peritoneal defect.      SURGEON:  Paul Farah MD      FIRST ASSISTANT:  Eliezer Celeste MD, Surgery Chief Resident.      ANESTHESIA:  General endotracheal.      INDICATIONS FOR PROCEDURE:  This patient is status post a laparoscopic bilateral inguinal hernioplasty, robot-assisted, done on Monday of this week.  Postoperatively, he has failed to progress, continues with pain and slowly developed an obvious small-bowel obstruction.  He presented here, at which time a CT scan confirmed a small-bowel obstruction with transition point at the right pelvis, which would be consistent with a postoperative complication from the actual surgery there.  As such, I recommended emergent surgery and exploration proceed as indicated, understanding that we would repair as needed and resected as needed.  The patient understood.  I had a full discussion of the patient's father as well, and both understood, had answers to their questions and gave informed consent.      OPERATIVE FINDINGS:  Small-bowel obstruction secondary to internal hernia into the peritoneal defect created from the previous repair of right inguinal hernia.  All small bowel was completely viable.  Bowel reduced with good viable tissue throughout as witnessed at the start of surgery and at the completion.  The peritoneal defect from opening of the suture line at the peritoneal closure done this past Monday re-repair; refer to below.      DESCRIPTION OF PROCEDURE:  The patient was brought to the operating room and put under general anesthesia.  His abdomen was widely prepped and  draped in the usual sterile fashion.  A left subcostal skin tiny incision was made.  Veress needle was introduced and abdomen insufflated.  A 5 port was placed at previous left lateral surgical site.  Another 5 port was placed at the right lateral.  Using these 2, I was able to visualize quite well and noted the peritoneal defect at the right pelvis.  Another 5 port was placed supraumbilical under direct vision.  Using these ports, I was able to easily reduce the small bowel out of that internal hernia and noted that all bowel was completely viable.  There was no compromise whatsoever.  The peritoneal defect was then closed with a running 3-0 Stratafix under direct vision.  This was further reinforced with a 3-0 Vicryl using interrupted knot-tying technique to close the small defect that was still apparent after the closure.  A Veress needle was introduced into the preperitoneal space per my routine, and we used this to decompress the space, increase abdominal pressure and noted that the peritoneum was in fact closed.  The patient was returned to a flat position, which reduced bowel back into the pelvis, and I noted that it was in good position, holding the peritoneum in place.  The abdomen was then deflated.  Ports were removed.  Skin closed with subcuticular, and Steri-Strips were applied.  Estimated blood loss was minimal.  The patient tolerated the procedure well and was taken to the recovery room where he was without difficulty or apparent complication.         MONICA ANNE MD             D: 2020   T: 2020   MT:       Name:     NEWTON WITT   MRN:      [id number]        Account:        [id number]   :      1990           Procedure Date: 2020      Document: [id number]

## 2020-11-12 NOTE — H&P
GENERAL SURGERY ADMISSION HISTORY & PHYSICAL  Johnnie Sahni MRN# [id number]   YOB: 1990 Age: 30 year old     Date of Admission: 11/11/20    CC: abdominal pain, nausea, vomiting    HPI: Johnnie Sahni is a 30 year old year old male with recent bilateral inguinal hernia repair (11/9) who presents with nausea, emesis, and lower abdominal pain for the past 2 days. He has been taking Norco for the pain and began taking Vicodin on 11/11 with limited pain relief. The pain is localized to the lower abdomen and Johnnie rates the pain at a 9/10 at it's worst. Johnnie attempted to eat food after surgery, but experienced nausea and repeated vomiting that consisted of food. He has continued to vomit despite taking Zofran and has been unable to keep down any food or liquid. His vomit the past two days has been a green liquid. Johnnie has additionally not been able to urinate much since the surgery. He has attempted to go, but is only able to generate a weak stream and feels like he is not completely emptying his bladder.    REVIEW OF SYSTEMS: The remainder of the complete ROS was negative unless noted in the HPI. Denies visual changes, headache, sore throat, rhinorrhea, chest pain, sob, diarrhea, fevers, night sweats, weight loss.    PAST MEDICAL HISTORY: History reviewed. No pertinent past medical history.    PAST SURGICAL HISTORY:   Past Surgical History:   Procedure Laterality Date     HERNIA REPAIR Bilateral 11/09/2020    [place]       ALLERGIES:  No Known Allergies    HOME MEDICATIONS: No current facility-administered medications on file prior to encounter.        diazepam (VALIUM) 5 MG tablet, Take 1 tablet (5 mg) by mouth every 6 hours as needed for anxiety       HYDROcodone-acetaminophen (NORCO) 5-325 MG tablet, Take 1-2 tablets by mouth every 6 hours as needed for severe pain       ondansetron (ZOFRAN) 4 MG tablet, Take 1 tablet (4 mg) by mouth every 6 hours as needed for nausea       polyethylene glycol (MIRALAX)  "17 g packet, Take 1 packet by mouth daily        SOCIAL HISTORY:   Social History     Tobacco Use     Smoking status: Never Smoker     Smokeless tobacco: Never Used   Substance Use Topics     Alcohol use: Not Currently     Drug use: Never       FAMILY HISTORY: History reviewed. No pertinent family history.    PHYSICAL EXAMINATION:  BP (!) 125/96   Pulse 53   Temp 98.6  F (37  C) (Oral)   Resp 18   Ht 1.778 m (5' 10\")   Wt 68 kg (150 lb)   SpO2 94%   BMI 21.52 kg/m       General: NAD, awake and alert   CV: Non-cyanotic   Pulm: No increased work of breathing on room air   Abd: soft, non-distended, Lower abdominal tenderness to deep palpation. Bowel sounds audible. No rebound tenderness.   Extremities: WWP without edema  Neuro: No focal deficits noted, patient moves all extremities spontaneously    LABS:  Recent Labs   Lab 11/11/20 2151   WBC 11.3*   RBC 6.24*   HGB 18.1*   HCT 54.4*   MCV 87   MCH 29.0   MCHC 33.3   RDW 13.7          Recent Labs   Lab 11/11/20 2151      POTASSIUM 3.8   CHLORIDE 98   CO2 29   BUN 19   CR 1.25   *   SANTOS 10.3*       Recent Labs   Lab 11/11/20 2151   AST 16   ALT 19   ALKPHOS 73   BILITOTAL 0.9   ALBUMIN 4.1       IMAGING:  Ct Abdomen Pelvis W Contrast  Result Date: 11/11/2020  IMPRESSION: 1.  Evidence for a relatively high-grade small bowel obstruction.    A/P: Johnnie Sahni is a 30 year old male with no significant PMH s/p laparoscopic bilateral inguinal hernia repair on 11//9/20 with Dr. Farah, now with symptoms concerning for SBO.    - Admit to General Surgery under Dr. Lee  - NPO  - IVF  - [facility] [place]  - Repeat labs in AM    Shady Ortiz, MS3  [employer]    I, Willam Brown MD, have personally seen and examined the patient. I have read and edited the medical student's documentation where appropriate, and agree with the stated findings and plan.    Discussed with Dr. Celeste and Dr. Jesus Brown, " MD  Plastic Surgery PGY2  [phone]

## 2020-11-12 NOTE — ED PROVIDER NOTES
[facility] (Formerly [facility])  November 11, 2020  ED 11 9:58 PM   History     Chief Complaint   Patient presents with     Post-op Problem     Nausea & Vomiting     The history is provided by the patient and medical records.     Johnnie Sahni is a 30 year old male who presents with severe abdominal pain, nausea, vomiting and constipation. He had robot-assisted laparoscopic bilateral inguinal hernioplasty on 11/9/20 through the [facility] in [place] with Dr. Farah. He was discharged to home but has been feeling unwell afterwards.  Patient states that has had abdominal pain, recurrent nausea and vomiting and inability to keep anything down. He also hasn't passed any stool or flatus. When he tries to drink water he has to sip it, ends up burping and vomits this. No cough, colds or fevers. He hasn't taken anything since midday.  He just moved here from [place].     PAST MEDICAL HISTORY: History reviewed. No pertinent past medical history.    PAST SURGICAL HISTORY:   Past Surgical History:   Procedure Laterality Date     HERNIA REPAIR Bilateral 11/09/2020    [place]       Past medical history, past surgical history, medications, and allergies were reviewed with the patient. Additional pertinent items: None    FAMILY HISTORY: History reviewed. No pertinent family history.    SOCIAL HISTORY:   Social History     Tobacco Use     Smoking status: Never Smoker     Smokeless tobacco: Never Used   Substance Use Topics     Alcohol use: Not Currently     Social history was reviewed with the patient. Additional pertinent items: None      Patient's Medications   New Prescriptions    No medications on file   Previous Medications    DIAZEPAM (VALIUM) 5 MG TABLET    Take 1 tablet (5 mg) by mouth every 6 hours as needed for anxiety    HYDROCODONE-ACETAMINOPHEN (NORCO) 5-325 MG TABLET    Take 1-2 tablets by mouth every 6 hours as needed for severe pain    ONDANSETRON (ZOFRAN) 4 MG TABLET    Take 1  "tablet (4 mg) by mouth every 6 hours as needed for nausea    POLYETHYLENE GLYCOL (MIRALAX) 17 G PACKET    Take 1 packet by mouth daily   Modified Medications    No medications on file   Discontinued Medications    No medications on file        No Known Allergies     Review of Systems   Constitutional: Negative for fever.   Respiratory: Negative for cough.    Gastrointestinal: Positive for abdominal pain, constipation, nausea and vomiting.     A complete review of systems was performed with pertinent positives and negatives noted in the HPI, and all other systems negative.    Physical Exam   BP: 113/81  Pulse: 63  Temp: 98.6  F (37  C)  Resp: 18  Height: 177.8 cm (5' 10\")  Weight: 68 kg (150 lb)  SpO2: 98 %      Physical Exam  Vitals signs and nursing note reviewed.   Constitutional:       General: He is not in acute distress.     Appearance: He is well-developed. He is not ill-appearing, toxic-appearing or diaphoretic.      Comments: Standing on the side of the bed obviously uncomfortable nondiaphoretic, lucid, fully conversant, no  respiratory distress, alert and oriented.     HENT:      Head: Normocephalic and atraumatic.   Eyes:      General: No scleral icterus.     Extraocular Movements: Extraocular movements intact.      Pupils: Pupils are equal, round, and reactive to light.   Neck:      Musculoskeletal: Normal range of motion and neck supple.   Cardiovascular:      Rate and Rhythm: Normal rate.      Pulses: Normal pulses.   Pulmonary:      Effort: Pulmonary effort is normal. No respiratory distress.   Abdominal:      Comments: Well-healing wounds, minimally tender, no significant distention   Musculoskeletal:         General: No tenderness.   Skin:     General: Skin is warm and dry.      Findings: No rash.   Neurological:      Mental Status: He is alert and oriented to person, place, and time.         ED Course        Procedures                           Results for orders placed or performed during the " hospital encounter of 11/11/20 (from the past 24 hour(s))   CBC with platelets differential   Result Value Ref Range    WBC 11.3 (H) 4.0 - 11.0 10e9/L    RBC Count 6.24 (H) 4.4 - 5.9 10e12/L    Hemoglobin 18.1 (H) 13.3 - 17.7 g/dL    Hematocrit 54.4 (H) 40.0 - 53.0 %    MCV 87 78 - 100 fl    MCH 29.0 26.5 - 33.0 pg    MCHC 33.3 31.5 - 36.5 g/dL    RDW 13.7 10.0 - 15.0 %    Platelet Count 318 150 - 450 10e9/L    Diff Method Automated Method     % Neutrophils 78.3 %    % Lymphocytes 11.3 %    % Monocytes 9.5 %    % Eosinophils 0.2 %    % Basophils 0.4 %    % Immature Granulocytes 0.3 %    Nucleated RBCs 0 0 /100    Absolute Neutrophil 8.9 (H) 1.6 - 8.3 10e9/L    Absolute Lymphocytes 1.3 0.8 - 5.3 10e9/L    Absolute Monocytes 1.1 0.0 - 1.3 10e9/L    Absolute Eosinophils 0.0 0.0 - 0.7 10e9/L    Absolute Basophils 0.0 0.0 - 0.2 10e9/L    Abs Immature Granulocytes 0.0 0 - 0.4 10e9/L    Absolute Nucleated RBC 0.0    Comprehensive metabolic panel   Result Value Ref Range    Sodium 134 133 - 144 mmol/L    Potassium 3.8 3.4 - 5.3 mmol/L    Chloride 98 94 - 109 mmol/L    Carbon Dioxide 29 20 - 32 mmol/L    Anion Gap 8 3 - 14 mmol/L    Glucose 112 (H) 70 - 99 mg/dL    Urea Nitrogen 19 7 - 30 mg/dL    Creatinine 1.25 0.66 - 1.25 mg/dL    GFR Estimate 76 >60 mL/min/[1.73_m2]    GFR Estimate If Black 88 >60 mL/min/[1.73_m2]    Calcium 10.3 (H) 8.5 - 10.1 mg/dL    Bilirubin Total 0.9 0.2 - 1.3 mg/dL    Albumin 4.1 3.4 - 5.0 g/dL    Protein Total 8.8 6.8 - 8.8 g/dL    Alkaline Phosphatase 73 40 - 150 U/L    ALT 19 0 - 70 U/L    AST 16 0 - 45 U/L   CT Abdomen Pelvis w Contrast    Narrative    EXAM: CT ABDOMEN PELVIS W CONTRAST  LOCATION: [facility]  DATE/TIME: 11/11/2020 10:50 PM    INDICATION: Abdominal pain, nausea and vomiting post hernia surgery.  COMPARISON: None.  TECHNIQUE: CT scan of the abdomen and pelvis was performed following injection of IV contrast. Multiplanar reformats were obtained. Dose reduction  techniques were used.  CONTRAST: 92 ml isovue 370.    FINDINGS:   LOWER CHEST: Normal.    HEPATOBILIARY: Normal.    PANCREAS: Normal.    SPLEEN: Normal.    ADRENAL GLANDS: Normal.    KIDNEYS/BLADDER: Tiny cyst left kidney requiring no further evaluation otherwise normal.    BOWEL: Dilated proximal and mid small bowel with decompressed distal small bowel and colon consistent with a small bowel obstruction with transition in right lower quadrant. Small volume mildly complex ascites with tiny bubbles of free air from recent   surgery. Normal appendix.    LYMPH NODES: Normal.    VASCULATURE: Unremarkable.    PELVIC ORGANS: Normal.    MUSCULOSKELETAL: Patient is post bilateral inguinal hernia repair. Small bubbles of gas and edema noted in the inguinal and anterior abdominal wall soft tissues, not unexpected given recent surgery. Presumed bone islands.      Impression    IMPRESSION:   1.  Evidence for a relatively high-grade small bowel obstruction.     Medications   lactated ringers infusion (1,000 mLs Intravenous New Bag 11/11/20 2303)   ondansetron (ZOFRAN) injection 4 mg (4 mg Intravenous Given 11/12/20 0022)   HYDROmorphone (DILAUDID) injection 1 mg (1 mg Intravenous Given 11/12/20 0022)   lactated ringers BOLUS 1,000 mL (0 mLs Intravenous Stopped 11/11/20 2303)   iopamidol (ISOVUE-370) solution 92 mL (92 mLs Intravenous Given 11/11/20 2257)   sodium chloride (PF) 0.9% PF flush 73 mL (73 mLs Intravenous Given 11/11/20 2257)   oxymetazoline (AFRIN) 0.05 % spray 2 spray (2 sprays Nasal Given 11/12/20 0019)   lidocaine (XYLOCAINE) 4 % solution 5 mL (5 mLs Topical Given 11/12/20 0019)             Assessments & Plan (with Medical Decision Making)   This is a 30-year-old male who is postop day 2 from a robotic assisted bilateral inguinal hernia repair.  He states that he has had significant nausea and vomiting as well as no gas or bowel movement since his procedure.  He appears obviously uncomfortable and standing on the  side of the bed.  On physical exam his abdomen is soft and nondistended and is minimally tender throughout.  He is otherwise vitally stable.  Labs are reviewed as well as a CT which shows a high-grade small bowel obstruction.  An NG tube was placed and patient was provided with IV fluids, Dilaudid and Zofran.  General surgery was consulted and at this time they will admit to their service for continued care management.    I have reviewed the nursing notes.    I have reviewed the findings, diagnosis, plan and need for follow up with the patient.    New Prescriptions    No medications on file       Final diagnoses:   Small bowel obstruction (H)     I, Delia Colorado, am serving as a trained medical scribe to document services personally performed by Wiliam Salgado MD based on the provider's statements to me on November 11, 2020.  This document has been checked and approved by the attending provider.    I, Wiliam Salgado MD, was physically present and have reviewed and verified the accuracy of this note documented by Delia Colorado, medical scribe.     11/11/2020   [facility] EMERGENCY DEPARTMENT     Wiliam Salgado MD  11/12/20 0025

## 2020-11-12 NOTE — ANESTHESIA PROCEDURE NOTES
Airway   Date/Time: 11/12/2020 10:41 AM   Patient location during procedure: OR    Staff -   CRNA: Brenda Washington APRN CRNA  Performed By: CRNA    Consent for Airway   Urgency: elective    Indications and Patient Condition  Indications for airway management: mavis-procedural  Induction type:RSIMask difficulty assessment: 0 - not attempted    Final Airway Details  Final airway type: endotracheal airway  Successful airway:ETT - single and Oral  Endotracheal Airway Details   ETT size (mm): 7.5  Cuffed: yes  Successful intubation technique: direct laryngoscopy  Grade View of Cords: 1 (cords open and clear)  Adjucts: stylet  Measured from: lips  Secured at (cm): 23  Secured with: commercial tube vasquez  Bite block used: None    Post intubation assessment   Placement verified by: capnometry, equal breath sounds and chest rise   Number of attempts at approach: 1  Secured with:commercial tube vasquez  Ease of procedure: easy  Dentition: Intact and Unchanged

## 2020-11-12 NOTE — TELEPHONE ENCOUNTER
"\"I had hernia surgery on Monday 11/9 and I have not had a bowel movement since. Today I have vomited 4 times. The on call doctor for the surgeon advised ER.\"  Denies fever or other sx.   Patient states \"I have been taking double the Miralax every day and no BM.\"  Triaged and advised ER  Tg Hines RN [facility]    COVID 19 Nurse Triage Plan/Patient Instructions    Please be aware that novel coronavirus (COVID-19) may be circulating in the community. If you develop symptoms such as fever, cough, or SOB or if you have concerns about the presence of another infection including coronavirus (COVID-19), please contact your health care provider or visit [email or web address].     Disposition/Instructions    ED Visit recommended. Follow protocol based instructions.     Bring Your Own Device:  Please also bring your smart device(s) (smart phones, tablets, laptops) and their charging cables for your personal use and to communicate with your care team during your visit.    Thank you for taking steps to prevent the spread of this virus.  o Limit your contact with others.  o Wear a simple mask to cover your cough.  o Wash your hands well and often.    Resources    [facility]: About COVID-19: [email or web address]    CDC: What to Do If You're Sick: www.cdc.gov/coronavirus/2019-ncov/about/steps-when-sick.html    CDC: Ending Home Isolation: www.cdc.gov/coronavirus/2019-ncov/hcp/disposition-in-home-patients.html     CDC: Caring for Someone: www.cdc.gov/coronavirus/2019-ncov/if-you-are-sick/care-for-someone.html     [facility]: Interim Guidance for Hospital Discharge to Home: www.health.[facility].[place]/diseases/coronavirus/hcp/hospdischarge.pdf    [facility] clinical trials (COVID-19 research studies): clinicalaffairs.[facility]/umn-clinical-trials     Below are the COVID-19 hotlines at the [place] Department of Health ([facility]). Interpreters are available.   o For health questions: Call 894-991-6604 or " 1-385.733.7367 (7 a.m. to 7 p.m.)  o For questions about schools and childcare: Call 318-437-9289 or 1-106.307.5174 (7 a.m. to 7 p.m.)                       Additional Information    Negative: Patient sounds very sick or weak to the triager    [1] Vomiting AND [2] abdomen looks much more swollen than usual    Protocols used: CONSTIPATION-A-AH

## 2020-11-13 ENCOUNTER — PATIENT OUTREACH (OUTPATIENT)
Dept: CARE COORDINATION | Facility: CLINIC | Age: 30
End: 2020-11-13

## 2020-11-13 VITALS
HEIGHT: 70 IN | TEMPERATURE: 98.3 F | OXYGEN SATURATION: 98 % | DIASTOLIC BLOOD PRESSURE: 73 MMHG | SYSTOLIC BLOOD PRESSURE: 112 MMHG | WEIGHT: 145.3 LBS | BODY MASS INDEX: 20.8 KG/M2 | RESPIRATION RATE: 13 BRPM | HEART RATE: 59 BPM

## 2020-11-13 LAB
ANION GAP SERPL CALCULATED.3IONS-SCNC: 4 MMOL/L (ref 3–14)
BUN SERPL-MCNC: 10 MG/DL (ref 7–30)
CALCIUM SERPL-MCNC: 9 MG/DL (ref 8.5–10.1)
CHLORIDE SERPL-SCNC: 102 MMOL/L (ref 94–109)
CO2 SERPL-SCNC: 30 MMOL/L (ref 20–32)
CREAT SERPL-MCNC: 0.89 MG/DL (ref 0.66–1.25)
ERYTHROCYTE [DISTWIDTH] IN BLOOD BY AUTOMATED COUNT: 13.2 % (ref 10–15)
GFR SERPL CREATININE-BSD FRML MDRD: >90 ML/MIN/{1.73_M2}
GLUCOSE SERPL-MCNC: 96 MG/DL (ref 70–99)
HCT VFR BLD AUTO: 40.6 % (ref 40–53)
HGB BLD-MCNC: 13.6 G/DL (ref 13.3–17.7)
MAGNESIUM SERPL-MCNC: 2 MG/DL (ref 1.6–2.3)
MCH RBC QN AUTO: 29.2 PG (ref 26.5–33)
MCHC RBC AUTO-ENTMCNC: 33.5 G/DL (ref 31.5–36.5)
MCV RBC AUTO: 87 FL (ref 78–100)
PHOSPHATE SERPL-MCNC: 3.8 MG/DL (ref 2.5–4.5)
PLATELET # BLD AUTO: 230 10E9/L (ref 150–450)
POTASSIUM SERPL-SCNC: 3.6 MMOL/L (ref 3.4–5.3)
RBC # BLD AUTO: 4.66 10E12/L (ref 4.4–5.9)
SODIUM SERPL-SCNC: 136 MMOL/L (ref 133–144)
WBC # BLD AUTO: 9.1 10E9/L (ref 4–11)

## 2020-11-13 PROCEDURE — 36415 COLL VENOUS BLD VENIPUNCTURE: CPT | Performed by: SURGERY

## 2020-11-13 PROCEDURE — 80048 BASIC METABOLIC PNL TOTAL CA: CPT | Performed by: SURGERY

## 2020-11-13 PROCEDURE — 85027 COMPLETE CBC AUTOMATED: CPT | Performed by: SURGERY

## 2020-11-13 PROCEDURE — 258N000003 HC RX IP 258 OP 636: Performed by: STUDENT IN AN ORGANIZED HEALTH CARE EDUCATION/TRAINING PROGRAM

## 2020-11-13 PROCEDURE — 83735 ASSAY OF MAGNESIUM: CPT | Performed by: SURGERY

## 2020-11-13 PROCEDURE — 84100 ASSAY OF PHOSPHORUS: CPT | Performed by: SURGERY

## 2020-11-13 PROCEDURE — 250N000013 HC RX MED GY IP 250 OP 250 PS 637: Performed by: STUDENT IN AN ORGANIZED HEALTH CARE EDUCATION/TRAINING PROGRAM

## 2020-11-13 RX ADMIN — OXYCODONE HYDROCHLORIDE 5 MG: 5 TABLET ORAL at 07:05

## 2020-11-13 RX ADMIN — SODIUM CHLORIDE, POTASSIUM CHLORIDE, SODIUM LACTATE AND CALCIUM CHLORIDE 1000 ML: 600; 310; 30; 20 INJECTION, SOLUTION INTRAVENOUS at 01:54

## 2020-11-13 RX ADMIN — OXYCODONE HYDROCHLORIDE 5 MG: 5 TABLET ORAL at 00:55

## 2020-11-13 ASSESSMENT — ACTIVITIES OF DAILY LIVING (ADL)
ADLS_ACUITY_SCORE: 14

## 2020-11-13 NOTE — PLAN OF CARE
DISCHARGE    D: Orders written for discharge to home    A/I: Alert and oriented. Pain is managed with prn po oxycodone 5 mg. Lap sites with sterile strips CDI. Per patient he is passing gas. Bowel sounds audible. Voiding without difficulty. AVS reviewed with patient. Verbalized understanding. IV accessed discontinue. Site CDI. Dressing in place. Copy of AVS given to pt.    PLAN: Discharge to home with SO in a wheelchair.

## 2020-11-13 NOTE — PROGRESS NOTES
"General Surgery Progress Note    Subjective: Had a diagnostic laparoscopy yesterday that demonstrated a small internal hernia secondary to a peritoneal defect at the right inguinal site. Successfully reduced and found to be viable. Now doing well without nausea or uncontrollable pain. Now passing gas and voiding freely    Objective:   /63 (BP Location: Left arm)   Pulse 69   Temp 99.3  F (37.4  C) (Oral)   Resp 12   Ht 1.778 m (5' 10\")   Wt 65.9 kg (145 lb 4.8 oz)   SpO2 96%   BMI 20.85 kg/m      PE:  Gen: Well appearing  CV: Regular rate and non-cyanotic appearing   Resp: non-labored breathing on room air   Abd: Soft, non-distended, appropriately tender, no rigidity or guarding  Ext: warm and well perfused  Incision: c/d/i      Intake/Output Summary (Last 24 hours) at 11/13/2020 0614  Last data filed at 11/13/2020 0159  Gross per 24 hour   Intake 3832.58 ml   Output 2951 ml   Net 881.58 ml       Recent labs reviewed.    Recent imaging reviewed.    A/P: Johnnie Sahni is a 30 year old male, who is status post diagnostic laparoscopy on 11/12/2020 for SBO after hernia repair    - Pain control  - Encourage IS  - Advance diet as tolerated  - Discontinue IVF  - Ambulate  - Discharge home   PPx: Not indicated    Seen and discussed with chief resident who will discuss with staff.    Cash Hastings MD  Surgery PGY-1    "

## 2020-11-13 NOTE — PLAN OF CARE
[id number] Abdominal lap sites C/D/I. Hypoactive bowel sounds. Passing flatus, no BM. Denies nausea, tolerating full liquid diet. Abdominal discomfort managed with 5 mg oxycodone. Pt declined scheduled Tylenol. Voiding spontaneously adequate amounts. Up with minimal assist, steady gait. Alert and oriented x4. VSS on room air. Pt hopeful to discharge home today.     Update: Pt advanced to regular diet this morning.

## 2020-11-13 NOTE — PLAN OF CARE
Neuro: A&Ox4.   Cardiac:  VSS. HR 50s-60s. Afebrile.    Respiratory: Sating 98% on RA.  GI/: Voiding spontaneously. Reports small amt of flatus  Diet/appetite: Tolerating full liq diet. Denies nausea.   Activity:  Up ind in room.   Pain: Incisional pain, oxy x 2 w some relief  Skin: Lap sites x 5 steri strip, CDI   LDA's: R PIV x 2     Plan: Likely discharge to home in AM

## 2020-11-16 ENCOUNTER — PATIENT OUTREACH (OUTPATIENT)
Dept: SURGERY | Facility: CLINIC | Age: 30
End: 2020-11-16

## 2020-11-16 NOTE — LETTER
November 16, 2020      TO: Johnnie Sahni  [address] [place] [address]       To Whom It May Concern:    Johnnie Sahni is under my professional care after undergoing a surgical procedure on November 9, 2020. Unfortunately, Mr. Sahni developed a rare complication which required an additional surgical procedure and hospitalization.  Johnnie required a period of recovery and was unable to attend classes November 9 - November 16, 2020.  Please allow him to complete any classwork that he may have missed.    Sincerely,        Paul Farah MD  /    Email:  [email or web address]

## 2020-11-16 NOTE — PROGRESS NOTES
Johnnie Sahni is a patient of Dr. Paul Farah that underwent diagnostic laparoscopic laparoscopic and repair of  Peritoneal defect approximately 4 days ago.  Attempted to contact patient via telephone for a status update and review post op teaching.  LM on  to call office.  Await return call.      Of note:  Wound:  Steri-strips  Follow-up:  Dr. Farah would like virtual or IN person visit.  Restrictions:  - No activity restrictions  New medications:  Tylenol, Oxycodone, Zofran  Equipment/Supplies:  None    ADDENDUM  11/16/20  1:40 PM    RN Post-Op/Post-Discharge Care Coordination Note    Spoke with Patient.    Support  Patient able to care for self independently     Health Status  Fevers/chills: Patient denies any fever or chills.  Nausea/Vomiting: Patient denies nausea/vomiting.  Has not taken an Zofran.  Eating/drinking: Patient is able to eat and drink without any complaints.  Bowel habits: Patient reports having a normal bowel movement.  Drains (OSWALDO): N/A  Incisions: Patient denies any signs and symptoms of infection..  Wound closure:  Steri-strips  Pain: Improve.  He has not taken any Oxycodone today and was asked to wean off of narcotics.  He may take OTC analgesics PRN.    Activity/Restrictions  No restrictions    Equipment  None    Pathology reviewed with patient:  N/A    Forms/Letters  Yes- he would like it emailed to him.    All of his questions were answered including reviewing restrictions and wound care.  He will call this office if he has any further questions and/or concerns.      Virtual visit arranged 11/24 at 0930.    Whom and When to Call  Patient acknowledges understanding of how to manage any medication changes and   when to seek medical care.     Patient advised that if after hour medical concerns arise to please call [phone] and choose option 4 to speak to the physician on call.

## 2020-11-19 ENCOUNTER — TELEPHONE (OUTPATIENT)
Dept: SURGERY | Facility: CLINIC | Age: 30
End: 2020-11-19

## 2020-11-23 ENCOUNTER — PATIENT OUTREACH (OUTPATIENT)
Dept: SURGERY | Facility: CLINIC | Age: 30
End: 2020-11-23

## 2020-11-23 NOTE — PROGRESS NOTES
Pre Visit Call and Assessment    Date of call:  11/23/2020    Phone numbers:  Cell number on file:    Telephone Information:   Mobile [phone]       Reached patient/confirmed appointment:  No - left message:   on cell phone  Patient care team/Primary provider:  No Ref-Primary, Physician  Preferred outpatient pharmacy:    [facility] [place], [place] - [address] [place] [address]  Phone: [phone] Fax: [phone] Alternate Fax: [phone], [phone]    "[facility] [id number] [place], [place] - [address] AT [facility] [address]  [address]  [facility] [address]  Phone: [phone] Fax: [phone]    Referred to:  Dr. Paul Farah    Reason for visit:  Post op follow-up

## 2021-01-04 ENCOUNTER — HEALTH MAINTENANCE LETTER (OUTPATIENT)
Age: 31
End: 2021-01-04

## 2021-10-11 ENCOUNTER — HEALTH MAINTENANCE LETTER (OUTPATIENT)
Age: 31
End: 2021-10-11

## 2021-11-01 ENCOUNTER — TRANSCRIBE ORDERS (OUTPATIENT)
Dept: OTHER | Age: 31
End: 2021-11-01

## 2021-11-01 DIAGNOSIS — Z83.79 FAMILY HISTORY OF ULCERATIVE COLITIS: ICD-10-CM

## 2021-11-01 DIAGNOSIS — K92.1 BLOODY STOOL: Primary | ICD-10-CM

## 2021-12-15 ENCOUNTER — TRANSFERRED RECORDS (OUTPATIENT)
Dept: HEALTH INFORMATION MANAGEMENT | Facility: CLINIC | Age: 31
End: 2021-12-15
Payer: COMMERCIAL

## 2021-12-15 LAB — TSH SERPL-ACNC: 1.16 UIU/ML (ref 0.45–4.5)

## 2022-01-30 ENCOUNTER — HEALTH MAINTENANCE LETTER (OUTPATIENT)
Age: 32
End: 2022-01-30

## 2022-06-02 ENCOUNTER — TRANSFERRED RECORDS (OUTPATIENT)
Dept: HEALTH INFORMATION MANAGEMENT | Facility: CLINIC | Age: 32
End: 2022-06-02
Payer: COMMERCIAL

## 2022-06-08 ENCOUNTER — TRANSFERRED RECORDS (OUTPATIENT)
Dept: HEALTH INFORMATION MANAGEMENT | Facility: CLINIC | Age: 32
End: 2022-06-08
Payer: COMMERCIAL

## 2022-06-08 LAB
ALT SERPL-CCNC: 12 IU/L (ref 0–44)
AST SERPL-CCNC: 16 IU/L (ref 0–40)
CREATININE (EXTERNAL): 0.87 MG/DL (ref 0.76–1.27)
GFR ESTIMATED (EXTERNAL): 117 ML/MIN/1.73
GLUCOSE (EXTERNAL): 92 MG/DL (ref 65–99)
POTASSIUM (EXTERNAL): 4.3 MMOL/L (ref 3.5–5.2)

## 2022-06-29 ENCOUNTER — TRANSFERRED RECORDS (OUTPATIENT)
Dept: HEALTH INFORMATION MANAGEMENT | Facility: CLINIC | Age: 32
End: 2022-06-29

## 2022-09-24 ENCOUNTER — HEALTH MAINTENANCE LETTER (OUTPATIENT)
Age: 32
End: 2022-09-24

## 2022-10-05 ENCOUNTER — TRANSFERRED RECORDS (OUTPATIENT)
Dept: HEALTH INFORMATION MANAGEMENT | Facility: CLINIC | Age: 32
End: 2022-10-05

## 2022-12-02 ENCOUNTER — TRANSFERRED RECORDS (OUTPATIENT)
Dept: HEALTH INFORMATION MANAGEMENT | Facility: CLINIC | Age: 32
End: 2022-12-02

## 2023-03-17 ENCOUNTER — TRANSFERRED RECORDS (OUTPATIENT)
Dept: HEALTH INFORMATION MANAGEMENT | Facility: CLINIC | Age: 33
End: 2023-03-17
Payer: COMMERCIAL

## 2023-05-08 ENCOUNTER — HEALTH MAINTENANCE LETTER (OUTPATIENT)
Age: 33
End: 2023-05-08

## 2023-09-18 ENCOUNTER — TRANSFERRED RECORDS (OUTPATIENT)
Dept: HEALTH INFORMATION MANAGEMENT | Facility: CLINIC | Age: 33
End: 2023-09-18
Payer: COMMERCIAL

## 2023-09-18 LAB
CREATININE (EXTERNAL): 1.04 MG/DL (ref 0.76–1.27)
GFR ESTIMATED (EXTERNAL): 97 ML/MIN/1.73

## (undated) DEVICE — GLOVE PROTEXIS POWDER FREE SMT 7.5  2D72PT75X

## (undated) DEVICE — LINEN TOWEL PACK X5 5464

## (undated) DEVICE — LINEN TOWEL PACK X6 WHITE 5487

## (undated) DEVICE — COVER CAMERA IN-LIGHT DISP LT-C02

## (undated) DEVICE — LINEN GOWN XLG 5407

## (undated) DEVICE — SU VICRYL 3-0 RB-1 27" UND J215H

## (undated) DEVICE — TUBING C02 INSUFFLATION LAP FILTER HEATER 6198

## (undated) DEVICE — NDL INSUFFLATION 13GA 120MM C2201

## (undated) DEVICE — PREP CHLORAPREP 26ML TINTED ORANGE  260815

## (undated) DEVICE — SOL WATER IRRIG 1000ML BOTTLE 2F7114

## (undated) DEVICE — ESU GROUND PAD ADULT W/CORD E7507

## (undated) DEVICE — Device

## (undated) DEVICE — ANTIFOG SOLUTION W/FOAM PAD 31142527

## (undated) DEVICE — ENDO TROCAR FIRST ENTRY KII FIOS Z-THRD 05X100MM CTF03

## (undated) DEVICE — ESU ENDO SCISSORS 5MM CVD 5DCS

## (undated) DEVICE — ENDO TROCAR SLEEVE KII Z-THREADED 05X100MM CTS02

## (undated) DEVICE — SU MONOCRYL 4-0 PS-2 18" UND Y496G

## (undated) DEVICE — DRSG STERI STRIP 1/2X4" R1547

## (undated) DEVICE — SU STRATAFIX MONOCRYL 3-0 SPIRAL SH 23CM SXMP1B427

## (undated) RX ORDER — CEFAZOLIN SODIUM 2 G/100ML
INJECTION, SOLUTION INTRAVENOUS
Status: DISPENSED
Start: 2020-11-12

## (undated) RX ORDER — ONDANSETRON 2 MG/ML
INJECTION INTRAMUSCULAR; INTRAVENOUS
Status: DISPENSED
Start: 2020-11-12

## (undated) RX ORDER — FENTANYL CITRATE 50 UG/ML
INJECTION, SOLUTION INTRAMUSCULAR; INTRAVENOUS
Status: DISPENSED
Start: 2020-11-12

## (undated) RX ORDER — DEXAMETHASONE SODIUM PHOSPHATE 4 MG/ML
INJECTION, SOLUTION INTRA-ARTICULAR; INTRALESIONAL; INTRAMUSCULAR; INTRAVENOUS; SOFT TISSUE
Status: DISPENSED
Start: 2020-11-12

## (undated) RX ORDER — LIDOCAINE HYDROCHLORIDE 20 MG/ML
INJECTION, SOLUTION EPIDURAL; INFILTRATION; INTRACAUDAL; PERINEURAL
Status: DISPENSED
Start: 2020-11-12

## (undated) RX ORDER — HYDROMORPHONE HYDROCHLORIDE 1 MG/ML
INJECTION, SOLUTION INTRAMUSCULAR; INTRAVENOUS; SUBCUTANEOUS
Status: DISPENSED
Start: 2020-11-12

## (undated) RX ORDER — PROPOFOL 10 MG/ML
INJECTION, EMULSION INTRAVENOUS
Status: DISPENSED
Start: 2020-11-12

## (undated) RX ORDER — GLYCOPYRROLATE 0.2 MG/ML
INJECTION, SOLUTION INTRAMUSCULAR; INTRAVENOUS
Status: DISPENSED
Start: 2020-11-12